# Patient Record
Sex: FEMALE | Race: WHITE | Employment: UNEMPLOYED | URBAN - METROPOLITAN AREA
[De-identification: names, ages, dates, MRNs, and addresses within clinical notes are randomized per-mention and may not be internally consistent; named-entity substitution may affect disease eponyms.]

---

## 2022-08-10 ENCOUNTER — OFFICE VISIT (OUTPATIENT)
Dept: FAMILY MEDICINE CLINIC | Age: 62
End: 2022-08-10
Payer: COMMERCIAL

## 2022-08-10 VITALS
WEIGHT: 265 LBS | TEMPERATURE: 97 F | DIASTOLIC BLOOD PRESSURE: 80 MMHG | HEIGHT: 67 IN | BODY MASS INDEX: 41.59 KG/M2 | OXYGEN SATURATION: 96 % | SYSTOLIC BLOOD PRESSURE: 138 MMHG | HEART RATE: 83 BPM

## 2022-08-10 DIAGNOSIS — S46.001A ROTATOR CUFF INJURY, RIGHT, INITIAL ENCOUNTER: Primary | ICD-10-CM

## 2022-08-10 PROCEDURE — 99202 OFFICE O/P NEW SF 15 MIN: CPT | Performed by: NURSE PRACTITIONER

## 2022-08-10 RX ORDER — PRAVASTATIN SODIUM 80 MG/1
TABLET ORAL
COMMUNITY

## 2022-08-10 RX ORDER — LISINOPRIL 20 MG/1
TABLET ORAL
COMMUNITY

## 2022-08-10 RX ORDER — ESOMEPRAZOLE MAGNESIUM 20 MG/1
20 FOR SUSPENSION ORAL DAILY
COMMUNITY

## 2022-08-10 RX ORDER — ASPIRIN 81 MG/1
TABLET ORAL
COMMUNITY

## 2022-08-10 RX ORDER — METFORMIN HYDROCHLORIDE 500 MG/1
TABLET, EXTENDED RELEASE ORAL
COMMUNITY
Start: 2021-09-21

## 2022-08-10 RX ORDER — HYDROCHLOROTHIAZIDE 12.5 MG/1
12.5 CAPSULE, GELATIN COATED ORAL DAILY
COMMUNITY

## 2022-08-10 RX ORDER — MAGNESIUM OXIDE 400 MG/1
TABLET ORAL
COMMUNITY

## 2022-08-10 RX ORDER — RIBOFLAVIN (VITAMIN B2) 100 MG
TABLET ORAL
COMMUNITY

## 2022-08-10 RX ORDER — GLIPIZIDE 5 MG/1
TABLET, FILM COATED, EXTENDED RELEASE ORAL
COMMUNITY
Start: 2022-08-08

## 2022-08-10 SDOH — ECONOMIC STABILITY: FOOD INSECURITY: WITHIN THE PAST 12 MONTHS, YOU WORRIED THAT YOUR FOOD WOULD RUN OUT BEFORE YOU GOT MONEY TO BUY MORE.: NEVER TRUE

## 2022-08-10 SDOH — ECONOMIC STABILITY: FOOD INSECURITY: WITHIN THE PAST 12 MONTHS, THE FOOD YOU BOUGHT JUST DIDN'T LAST AND YOU DIDN'T HAVE MONEY TO GET MORE.: NEVER TRUE

## 2022-08-10 ASSESSMENT — ENCOUNTER SYMPTOMS
SHORTNESS OF BREATH: 0
COUGH: 0
RHINORRHEA: 0
DIARRHEA: 0
ABDOMINAL PAIN: 0
VOMITING: 0
SORE THROAT: 0
WHEEZING: 0
ABDOMINAL DISTENTION: 0
CHEST TIGHTNESS: 0
SINUS PAIN: 0
SINUS PRESSURE: 0
TROUBLE SWALLOWING: 0
COLOR CHANGE: 0
NAUSEA: 0
CONSTIPATION: 0

## 2022-08-10 ASSESSMENT — PATIENT HEALTH QUESTIONNAIRE - PHQ9
SUM OF ALL RESPONSES TO PHQ9 QUESTIONS 1 & 2: 0
SUM OF ALL RESPONSES TO PHQ QUESTIONS 1-9: 0
1. LITTLE INTEREST OR PLEASURE IN DOING THINGS: 0
2. FEELING DOWN, DEPRESSED OR HOPELESS: 0
SUM OF ALL RESPONSES TO PHQ QUESTIONS 1-9: 0

## 2022-08-10 ASSESSMENT — SOCIAL DETERMINANTS OF HEALTH (SDOH): HOW HARD IS IT FOR YOU TO PAY FOR THE VERY BASICS LIKE FOOD, HOUSING, MEDICAL CARE, AND HEATING?: NOT VERY HARD

## 2022-08-10 NOTE — PROGRESS NOTES
urgency. Musculoskeletal:  Positive for arthralgias, myalgias and stiffness. Negative for gout, joint swelling, neck pain and neck stiffness. Skin:  Negative for color change, itching and rash. Neurological:  Negative for dizziness, tingling, tremors, seizures, syncope, speech difficulty, weakness, light-headedness, numbness and headaches. PAST MEDICAL HISTORY   No past medical history on file. SURGICAL HISTORY     Patient  has no past surgical history on file. CURRENT MEDICATIONS       Previous Medications    ASCORBIC ACID (VITAMIN C) 100 MG TABLET        ASPIRIN 81 MG EC TABLET        CHOLECALCIFEROL 50 MCG (2000 UT) TABS        ESOMEPRAZOLE MAGNESIUM (NEXIUM) 20 MG PACK    Take 20 mg by mouth in the morning. GLIPIZIDE (GLUCOTROL XL) 5 MG EXTENDED RELEASE TABLET        HYDROCHLOROTHIAZIDE (MICROZIDE) 12.5 MG CAPSULE    Take 12.5 mg by mouth in the morning. LACTOBACILLUS (ACIDOPHILUS) 0.5 MG TABS    Take 5 tablets by mouth    LISINOPRIL (PRINIVIL;ZESTRIL) 20 MG TABLET    lisinopril   daily    MAGNESIUM 100 MG CAPS        MAGNESIUM OXIDE (MAG-OX) 400 MG TABLET    Take by mouth    METFORMIN (GLUCOPHAGE-XR) 500 MG EXTENDED RELEASE TABLET        METHYLCOBALAMIN 1 MG CHEW        MULTIPLE VITAMINS-MINERALS (THRIVE FOR LIFE WOMENS) TABS        POTASSIUM 75 MG TABS    Take by mouth    PRAVASTATIN (PRAVACHOL) 80 MG TABLET           ALLERGIES     Patientis is allergic to penicillins and adhesive tape. FAMILY HISTORY     Patient's family history is not on file. SOCIAL HISTORY     Patient  reports that she has never smoked. She has never used smokeless tobacco. She reports that she does not drink alcohol and does not use drugs. PHYSICAL EXAM     ED TRIAGE VITALS  BP: 138/80, Temp: 97 °F (36.1 °C), Heart Rate: 83,  , SpO2: 96 %  Physical Exam  Constitutional:       General: She is not in acute distress. Appearance: Normal appearance. She is normal weight.  She is not ill-appearing, toxic-appearing or diaphoretic. HENT:      Head: Normocephalic and atraumatic. Right Ear: External ear normal.      Left Ear: External ear normal.   Eyes:      General:         Right eye: No discharge. Left eye: No discharge. Conjunctiva/sclera: Conjunctivae normal.      Pupils: Pupils are equal, round, and reactive to light. Cardiovascular:      Rate and Rhythm: Normal rate and regular rhythm. Pulses: Normal pulses. Pulmonary:      Effort: Pulmonary effort is normal.   Musculoskeletal:         General: Tenderness present. No swelling, deformity or signs of injury. Right shoulder: Tenderness present. No swelling, deformity, laceration, bony tenderness or crepitus. Decreased range of motion. Normal strength. Normal pulse. Arms:       Cervical back: Normal range of motion and neck supple. No rigidity or tenderness. Skin:     General: Skin is warm and dry. Capillary Refill: Capillary refill takes less than 2 seconds. Neurological:      General: No focal deficit present. Mental Status: She is alert and oriented to person, place, and time. Mental status is at baseline. Cranial Nerves: No cranial nerve deficit. Sensory: No sensory deficit. Motor: No weakness. Coordination: Coordination normal.       DIAGNOSTICRESULTS   Labs:       IMAGING:    URGENT CARE COURSE:     Vitals:    08/10/22 1604   BP: 138/80   Pulse: 83   Temp: 97 °F (36.1 °C)   TempSrc: Temporal   SpO2: 96%   Weight: 265 lb (120.2 kg)   Height: 5' 7\" (1.702 m)         PROCEDURES:  None  FINAL IMPRESSION      1. Rotator cuff injury, right, initial encounter        DISPOSITION/PLAN   DISPOSITION      PATIENT REFERRED TO:  Return in about 1 day (around 8/11/2022) for orthopedics as scheduled. DISCHARGE MEDICATIONS:  New Prescriptions    No medications on file     Cannot display discharge medications since this is not an admission.       Daniel Grijalva, APRN - CNP

## 2022-08-11 ENCOUNTER — OFFICE VISIT (OUTPATIENT)
Dept: ORTHOPEDIC SURGERY | Age: 62
End: 2022-08-11
Payer: COMMERCIAL

## 2022-08-11 ENCOUNTER — HOSPITAL ENCOUNTER (OUTPATIENT)
Dept: ORTHOPEDIC SURGERY | Age: 62
Discharge: HOME | End: 2022-08-13
Payer: COMMERCIAL

## 2022-08-11 VITALS
TEMPERATURE: 98.2 F | WEIGHT: 265 LBS | HEIGHT: 67 IN | BODY MASS INDEX: 41.59 KG/M2 | OXYGEN SATURATION: 97 % | HEART RATE: 78 BPM

## 2022-08-11 DIAGNOSIS — S46.011A ROTATOR CUFF STRAIN, RIGHT, INITIAL ENCOUNTER: ICD-10-CM

## 2022-08-11 DIAGNOSIS — S46.011A ROTATOR CUFF STRAIN, RIGHT, INITIAL ENCOUNTER: Primary | ICD-10-CM

## 2022-08-11 PROCEDURE — 73030 X-RAY EXAM OF SHOULDER: CPT

## 2022-08-11 PROCEDURE — 99242 OFF/OP CONSLTJ NEW/EST SF 20: CPT | Performed by: PHYSICIAN ASSISTANT

## 2022-08-11 PROCEDURE — 73030 X-RAY EXAM OF SHOULDER: CPT | Performed by: ORTHOPAEDIC SURGERY

## 2022-08-11 PROCEDURE — 20610 DRAIN/INJ JOINT/BURSA W/O US: CPT | Performed by: PHYSICIAN ASSISTANT

## 2022-08-11 RX ORDER — LIDOCAINE HYDROCHLORIDE 10 MG/ML
8 INJECTION, SOLUTION INFILTRATION; PERINEURAL ONCE
Status: COMPLETED | OUTPATIENT
Start: 2022-08-11 | End: 2022-08-11

## 2022-08-11 RX ORDER — TRIAMCINOLONE ACETONIDE 40 MG/ML
80 INJECTION, SUSPENSION INTRA-ARTICULAR; INTRAMUSCULAR ONCE
Status: COMPLETED | OUTPATIENT
Start: 2022-08-11 | End: 2022-08-11

## 2022-08-11 RX ADMIN — LIDOCAINE HYDROCHLORIDE 8 ML: 10 INJECTION, SOLUTION INFILTRATION; PERINEURAL at 11:11

## 2022-08-11 RX ADMIN — TRIAMCINOLONE ACETONIDE 80 MG: 40 INJECTION, SUSPENSION INTRA-ARTICULAR; INTRAMUSCULAR at 11:09

## 2022-08-11 ASSESSMENT — ENCOUNTER SYMPTOMS
GASTROINTESTINAL NEGATIVE: 1
EYES NEGATIVE: 1
RESPIRATORY NEGATIVE: 1

## 2022-08-11 NOTE — PROGRESS NOTES
Stephanie Hunter (:  1960) is a 64 y.o. female,New patient, consultation from JULIÁN Romano here for evaluation of the following chief complaint(s):  New Patient (Right shoulder pain. Pt has not had XR or MRI. Pt states this has been going on for 6 weeks. )         ASSESSMENT/PLAN:  1. Rotator cuff strain, right, initial encounter  -     XR SHOULDER RIGHT (MIN 2 VIEWS); Future  -     CT ARTHROCENTESIS ASPIR&/INJ MAJOR JT/BURSA W/O US  -     Ambulatory referral to Physical Therapy      Return in about 3 weeks (around 2022). Subjective   SUBJECTIVE/OBJECTIVE:  This is a 79-year-old female with complaint of right shoulder pain. She states she has been working out and felt a pulling sensation in her right shoulder when she was doing an overhead press. She states that shoulder has been sore and it has been limiting her activities. She has difficulty curling her hair, she has difficulty fastening her brassiere. Review of Systems   Constitutional: Negative. HENT: Negative. Eyes: Negative. Respiratory: Negative. Gastrointestinal: Negative. Genitourinary: Negative. Musculoskeletal: Negative. Psychiatric/Behavioral: Negative. Objective   Right shoulder x-rays performed today reviewed by me show no acute fracture or dislocation. Glenohumeral joint space is preserved. Moderate degenerative arthritis of the acromioclavicular joint. Physical Exam  Musculoskeletal:      Comments: Shoulder-no acromioclavicular, clavicle, SC joint tenderness with palpation. Abduction and abduction strength are strong and symmetrical.  Apprehension sign is negative. Internal rotation is 0 degrees, external rotation is 120 degrees about 10 degrees less than the left. Supraspinatus test elicits pain but no weakness. Biceps contour is normal.  Liftoff is negative. Patient reaches T11 behind her back with her right, T5 behind her back with her left.   Sensation is intact distally to light touch. Shoulder Injection Procedure Note    Pre-operative Diagnosis:    Diagnosis Orders   1. Rotator cuff strain, right, initial encounter  XR SHOULDER RIGHT (MIN 2 VIEWS)    FL ARTHROCENTESIS ASPIR&/INJ MAJOR JT/BURSA W/O US    Ambulatory referral to Physical Therapy           Post-operative Diagnosis:    Diagnosis Orders   1. Rotator cuff strain, right, initial encounter  XR SHOULDER RIGHT (MIN 2 VIEWS)    FL ARTHROCENTESIS ASPIR&/INJ MAJOR JT/BURSA W/O US    Ambulatory referral to Physical Therapy           Indications: tendonitis    Anesthesia: Ethyl Chloride    Procedure Details     Verbal consent was obtained for the procedure. The right shoulder was prepped with iodine and the skin was anesthetized. Using a 22 gauge needle the  is injected with 8 mL 1% lidocaine and 2 mL of triamcinolone (KENALOG) 40mg/ml under the posterior aspect of the acromion. The injection site was cleansed with topical isopropyl alcohol and a dressing was applied. Complications:  None; patient tolerated the procedure well. Explained to the patient she has rotator cuff injury. Injected today with cortisone. She will begin physical therapy. She is going with her family next week to [facility]. I like her to have at least 1 visit to physical therapy under her belt before she leaves. Like to see her back in about 3 weeks. He may take over-the-counter anti-inflammatory medications as well as Tylenol for pain. May use topical anti-inflammatory creams. On this date 8/11/2022 I have spent 35 minutes reviewing previous notes, test results and face to face with the patient discussing the diagnosis and importance of compliance with the treatment plan as well as documenting on the day of the visit. An electronic signature was used to authenticate this note.     --GARY Oilvares

## 2022-08-11 NOTE — PROGRESS NOTES
Patient's name, date of birth, and allergies have been confirmed. Patient is aware that injection is to be given in Right shoulder. He/she is aware that they will be seeing [facility] and the injection will be given by him. A timeout was performed immediately prior to the start of the injection procedure and included the correct patient (two identifiers), correct procedure and correct site(s). Procedure consent and allergies were also verified.

## 2022-08-18 ENCOUNTER — HOSPITAL ENCOUNTER (OUTPATIENT)
Dept: PHYSICAL THERAPY | Age: 62
Setting detail: THERAPIES SERIES
Discharge: HOME | End: 2022-08-18
Payer: COMMERCIAL

## 2022-08-18 PROCEDURE — 97161 PT EVAL LOW COMPLEX 20 MIN: CPT

## 2022-08-18 ASSESSMENT — PAIN DESCRIPTION - DESCRIPTORS: DESCRIPTORS: DULL

## 2022-08-18 ASSESSMENT — PAIN DESCRIPTION - LOCATION: LOCATION: ARM;SHOULDER

## 2022-08-18 ASSESSMENT — PAIN DESCRIPTION - ORIENTATION: ORIENTATION: RIGHT

## 2022-08-18 ASSESSMENT — PAIN SCALES - GENERAL: PAINLEVEL_OUTOF10: 3

## 2022-08-18 ASSESSMENT — PAIN DESCRIPTION - PAIN TYPE: TYPE: CHRONIC PAIN

## 2022-08-18 NOTE — PROGRESS NOTES
[address]  PHYSICAL THERAPY EVALUATION      Physical Therapy: Initial Evaluation    Patient: Shimon Laws (29 y.o.     female)   Examination Date: 2022   :  1960 ;    Confirmed: Yes MRN: [id number]  CSN: [id number]   Insurance: Payor: [employer] / Plan: Misbah Huff CINTHIA / Product Type: *No Product type* /   Insurance ID: [id number] - (Commercial) Secondary Insurance (if applicable):    Referring Physician: GARY Hernandez      PCP: Estela Tao DO Visits to Date/Visits Approved:  (Eval only)    No Show/Cancelled Appts:   /       Medical Diagnosis: Strain of muscle(s) and tendon(s) of the rotator cuff of right shoulder, initial encounter [S46.011A]    Treatment Diagnosis: Decreased R shoulder ROM, decreased R shoulder strength, increased pain, decreased ADL status     PERTINENT MEDICAL HISTORY   Patient Assessed for Rehabilitation Services: Yes       Medical History: Chart Reviewed: Yes No past medical history on file. Surgical History: No past surgical history on file.     Medications:   Current Outpatient Medications:     lisinopril (PRINIVIL;ZESTRIL) 20 MG tablet, lisinopril  daily, Disp: , Rfl:     Magnesium 100 MG CAPS, , Disp: , Rfl:     Potassium 75 MG TABS, Take by mouth, Disp: , Rfl:     Multiple Vitamins-Minerals (THRIVE FOR LIFE WOMENS) TABS, , Disp: , Rfl:     Ascorbic Acid (VITAMIN C) 100 MG tablet, , Disp: , Rfl:     Cholecalciferol 50 MCG (2000) TABS, , Disp: , Rfl:     aspirin 81 MG EC tablet, , Disp: , Rfl:     glipiZIDE (GLUCOTROL XL) 5 MG extended release tablet, , Disp: , Rfl:     hydroCHLOROthiazide (MICROZIDE) 12.5 MG capsule, Take 12.5 mg by mouth in the morning., Disp: , Rfl:     magnesium oxide (MAG-OX) 400 MG tablet, Take by mouth, Disp: , Rfl:     metFORMIN (GLUCOPHAGE-XR) 500 MG extended release tablet, , Disp: , Rfl:     Methylcobalamin 1 MG CHEW, , Disp: , Rfl:     pravastatin (PRAVACHOL) 80 MG tablet, Spouse  Type of Home: House  Home Layout: Two level, Laundry in basement  Home Access: Stairs to enter without rails  Entrance Stairs - Number of Steps: 2    Occupation/Interests:   Occupation: Other(comment)  Type of Occupation: Homemaker  Leisure & Hobbies: workingout, weight-lifting    Prior Level of Function:     Independent        Current Level of Function:          ADL Assistance: Independent  Homemaking Assistance: Independent  Homemaking Responsibilities: Yes  Ambulation Assistance: Independent  Transfer Assistance: Independent  Active : Yes    Dominant Hand: : Right    OBJECTIVE EXAMINATION     Review of Systems:  Vision: Impaired  Visual Deficits: Wears glasses  Hearing: Exceptions to [facility]  Hearing Exceptions: Hard of hearing/hearing concerns (has hearing aids but does not wear them)  Follows Commands: Within Functional Limits    Regional Screen:    Cervical Screen: decreased cervical ROM, d/t prior chronic neck pain per patient    Observations:   General Observations  General Observations: Yes  Description: Decreased sitting posture, fwd head, rounded shoulders, thoracic kyphosis    Palpation:    Inc tenderness to palpation over ant deltoid, biceps muscle belly    Mobility:      Ambulation  Surface: carpet  Device: No Device  Assistance: Independent  Quality of Gait: ambulation WFL, no significant deficits to note  Distance: clinical distance  More Ambulation?: No  Stairs/Curb  Stairs?: No    Neuro Screen: Sensation  Overall Sensation Status: WFL  Light Touch: No apparent deficits (intact to light touch over claudia UE dermatomal pattern)    Left AROM  Right AROM         AROM LUE (degrees)  L Shoulder Flexion 0-180: 143  L Shoulder ABduction 0-180: 140  L Shoulder Int Rotation  0-70: T7  L Shoulder Ext Rotation  0-90: T1    AROM RUE (degrees)  R Shoulder Flexion 0-180: 115  R Shoulder ABduction 0-180: 115  R Shoulder Int Rotation  0-70: T7  R Shoulder Ext Rotation 0-90: occiput      Left Strength  Right Strength         Strength LUE  L Shoulder Flexion: 5/5  L Shoulder ABduction: 5/5  L Shoulder Internal Rotation: 5/5  L Shoulder External Rotation: 5/5  L Elbow Flexion: 5/5  L Elbow Extension: 5/5    Strength RUE  R Shoulder Flexion: 3+/5  R Shoulder ABduction: 3+/5  R Shoulder Internal Rotation: 5/5  R Shoulder External Rotation: 3+/5  R Elbow Flexion: 5/5  R Elbow Extension: 5/5     Cervical Assessment     Decreased cervical ROM d/t chronic neck pain per patient        Special Tests:   Impingement Tests  Hawkins Parth Test: R (+), L (-) ((+) at endrange)  Lift-Off Test: R (-), L (-)  Empty Can: R (+), L (-)  Full Can: R (-), L (-)  Speeds Test: R (+), L (-)    Outcomes Score:  Exam: UEFI: 55/80       Treatment:    Exercises:   Exercises  Exercise 1: wall slides*  Exercise 2: UBE*  Exercise 3: Pulley*  Exercise 4: scapular stabilization*  Exercise 5: I,Y,T*  Exercise 6: rows/lats*  Exercise 7: corner stretch*     Modalities:  Modalities:  (HP vs CP*, US*)     Manual:  Manual Therapy  Joint Mobilization: PROM w/ distraction, joint mobs to shldr*  Soft Tissue Mobilizaton: STM to deltoid, biceps*     *Indicates exercise,modality, or manual techniques to be initiated when appropriate       ASSESSMENT     Impression: Assessment: Patient is a 65 yo female presenting with R shoulder and arm pain after initiation of weightlifting at the gym. Patient presented with decrease R shoulder ROM and weakness. ROM deficits more prominent in ABD and ER. Patient presented with increased TTP over biceps muscle belly, biceps origin, ant. deltoid. Patient also presented with positive speeds test, ER test and empty can. Indicating RTC and biceps pathology. Skilled therapy is indicated to improve stated deficits, to increase QOL and return to normal housework and weightlifting.     Body Structures, Functions, Activity Limitations Requiring Skilled Therapeutic Intervention: Decreased ADL status, Decreased functional mobility , Decreased ROM, Decreased strength, Increased pain    Statement of Medical Necessity: Physical Therapy is both indicated and medically necessary as outlined in the POC to increase the likelihood of meeting the functionally related goals stated below.      Patient's Activity Tolerance: Patient tolerated treatment well      Patient's rehabilitation potential/prognosis is considered to be: Good    Factors which may impact rehabilitation potential include: None     Patient Education: Goals, PT Role, Plan of Care, Evaluative findings, Insurance      GOALS   Patient Goal(s): Patient goals : \"strengthen right arm\"    Short Term Goals Completed by 2 weeks Goal Status   Patient will be indep with HEP New       Long Term Goals Completed by 6-8 weeks Goal Status   Patient will improve R shoulder ROM by >/=20 deg to improve mechanics with brushing hair and getting dressed New   Patient will increase R shoulder strength to 5/5 to equal opposite side to improve lifting mechanics during house work New   Patient will improve UEFI by >/=10 points to correlate to [employer] to demonstrate improved QOL New          TREATMENT PLAN       Requires PT Follow-Up: Yes    Treatment may include any combination of the following: Strengthening, ROM, Manual Therapy - Soft Tissue Mobilization, Neuromuscular re-education, Manual Therapy - Joint Manipulation, Pain management, Return to work related activity, Home exercise program, Safety education & training, Modalities, Integrated dry needling, Therapeutic activities     Frequency / Duration:  Patient to be seen 1x times per week for 6-8 weeks weeks  Plan Comment:               Eval Complexity:   Decision Making: Low Complexity  History: Personal Factors and/or Comorbidities Impacting POC: High  History: broken bones, diabetes, acid reflux, fatty liver disease, highBP, bronchitis, hearing loss, asthma  Examination of body system(s) including body structures and functions, activity limitations, and/or

## 2022-08-18 NOTE — PROGRESS NOTES
[address]     Ph: [phone]  Fax: [phone]      [x] Certification  [] Recertification [x]  Plan of Care  [] Progress Note [] Discharge      Referring Provider: GARY Jack      From:  Radha Gunter, PT , DPT  Patient: Winifred Naik (64 y.o. female) : 1960 Date: 2022   Medical Diagnosis: Strain of muscle(s) and tendon(s) of the rotator cuff of right shoulder, initial encounter [S46.011A]    Treatment Diagnosis: Decreased R shoulder ROM, decreased R shoulder strength, increased pain, decreased ADL status    Plan of Care/Certification Expiration Date: :     Progress Report Period from:  2022  to 2022    Visits to Date: 1 No Show:   Cancelled Appts:      OBJECTIVE:   Short Term Goals - Time Frame for Short term goals: 2 weeks    Goals Current/Discharge status  Status   Short term goal 1: Patient will be indep with HEP  Will administer at [address] [place] [address] Road - Time Frame for Long term goals : 6-8 weeks  Goals Current/ Discharge status Status   Long term goal 1: Patient will improve R shoulder ROM by >/=20 deg to improve mechanics with brushing hair and getting dressed    AROM LUE (degrees)  L Shoulder Flexion 0-180: 143  L Shoulder ABduction 0-180: 140  L Shoulder Int Rotation  0-70: T7  L Shoulder Ext Rotation  0-90: T1   AROM RUE (degrees)  R Shoulder Flexion 0-180: 115  R Shoulder ABduction 0-180: 115  R Shoulder Int Rotation  0-70: T7  R Shoulder Ext Rotation 0-90: occiput  New   Long term goal 2: Patient will increase R shoulder strength to 5/5 to equal opposite side to improve lifting mechanics during house work Strength LUE  L Shoulder Flexion: 5/5  L Shoulder ABduction: 5/5  L Shoulder Internal Rotation: 5/5  L Shoulder External Rotation: 5/5  L Elbow Flexion: 5/5  L Elbow Extension: 5/5  Strength RUE  R Shoulder Flexion: 3+/5  R Shoulder ABduction: 3+/5  R Shoulder Internal Rotation: 5/5  R Shoulder External Rotation: 3+/5  R Elbow Flexion: 5/5  R Elbow Extension: 5/5  New   Long term goal 3: Patient will improve UEFI by >/=10 points to correlate to [facility] to demonstrate improved QOL UEFI: 55/80 New       Body Structures, Functions, Activity Limitations Requiring Skilled Therapeutic Intervention: Decreased ADL status, Decreased functional mobility , Decreased ROM, Decreased strength, Increased pain  Assessment: Patient is a 63 yo female presenting with R shoulder and arm pain after initiation of weightlifting at the gym. Patient presented with decrease R shoulder ROM and weakness. ROM deficits more prominent in ABD and ER. Patient presented with increased TTP over biceps muscle belly, biceps origin, ant. deltoid. Patient also presented with positive speeds test, ER test and empty can. Indicating RTC and biceps pathology. Skilled therapy is indicated to improve stated deficits, to increase QOL and return to normal housework and weightlifting. Therapy Prognosis: Good      PT Education: Goals;PT Role;Plan of Care;Evaluative findings; Insurance    PLAN: [x] Evaluate and Treat  Frequency/Duration:  Plan Frequency: 1x  Plan weeks: 6-8 weeks  Current Treatment Recommendations: Strengthening, ROM, Manual Therapy - Soft Tissue Mobilization, Neuromuscular re-education, Manual Therapy - Joint Manipulation, Pain management, Return to work related activity, Home exercise program, Safety education & training, Modalities, Integrated dry needling, Therapeutic activities             Patient Status:[x] Continue/ Initiate plan of Care    [] Discharge PT. Recommend pt continue with HEP. [] Additional visits requested, Please re-certify for additional visits:    [] Hold         Signature: Electronically signed by Kumar Lee PT on 8/18/22 at 2:12 PM EDT      If you have any questions or concerns, please don't hesitate to call.   Thank you for your referral.

## 2022-09-01 ENCOUNTER — OFFICE VISIT (OUTPATIENT)
Dept: ORTHOPEDIC SURGERY | Age: 62
End: 2022-09-01
Payer: COMMERCIAL

## 2022-09-01 ENCOUNTER — HOSPITAL ENCOUNTER (OUTPATIENT)
Dept: ORTHOPEDIC SURGERY | Age: 62
Discharge: HOME | End: 2022-09-03
Payer: COMMERCIAL

## 2022-09-01 VITALS
BODY MASS INDEX: 41.59 KG/M2 | HEART RATE: 78 BPM | WEIGHT: 265 LBS | HEIGHT: 67 IN | OXYGEN SATURATION: 96 % | TEMPERATURE: 97.5 F

## 2022-09-01 DIAGNOSIS — S46.011D: Primary | ICD-10-CM

## 2022-09-01 DIAGNOSIS — S83.412A SPRAIN OF MEDIAL COLLATERAL LIGAMENT OF LEFT KNEE, INITIAL ENCOUNTER: ICD-10-CM

## 2022-09-01 PROCEDURE — 73564 X-RAY EXAM KNEE 4 OR MORE: CPT | Performed by: ORTHOPAEDIC SURGERY

## 2022-09-01 PROCEDURE — 99214 OFFICE O/P EST MOD 30 MIN: CPT | Performed by: PHYSICIAN ASSISTANT

## 2022-09-01 PROCEDURE — 73564 X-RAY EXAM KNEE 4 OR MORE: CPT

## 2022-09-01 ASSESSMENT — ENCOUNTER SYMPTOMS
EYES NEGATIVE: 1
RESPIRATORY NEGATIVE: 1
GASTROINTESTINAL NEGATIVE: 1

## 2022-09-01 NOTE — PROGRESS NOTES
Stephanie Hunter (:  1960) is a 64 y.o. female,Established patient, here for evaluation of the following chief complaint(s):  Follow-up (Follow up on right shoulder. Pt states she was supposed to start physical therapy but she never received call to start. Pt states her right shoulder feels weak when she try's to move it. Patient states she slipped on rocks, and tried to hold her self twisted her knee. [facility]. Patient states knee pain a 7/10.)         ASSESSMENT/PLAN:  1. Strain of rotator cuff capsule, right, subsequent encounter  2. Sprain of medial collateral ligament of left knee, initial encounter  -     XR KNEE LEFT (MIN 4 VIEWS); Future      No follow-ups on file. Subjective   SUBJECTIVE/OBJECTIVE:  Is a 79-year-old right-hand-dominant female with complaint of right shoulder pain as well as left knee pain. I did seen her previously for right shoulder pain. I injected her shoulder she attended her initial physical therapy visit. Her daughter-in-law put her into a knee immobilizer. There was concern for a ligament or tendon injury of the left knee. Review of Systems   Constitutional: Negative. HENT: Negative. Eyes: Negative. Respiratory: Negative. Gastrointestinal: Negative. Genitourinary: Negative. Musculoskeletal: Negative. Psychiatric/Behavioral: Negative. Objective     X-rays left knee performed today reviewed by me show no acute fracture or dislocation. Physical Exam  Musculoskeletal:      Comments: Left knee-joint effusion present. Popliteal cyst present. No warmth, erythema, fluctuance or sign of infection. Good extension strength against resistance. Flexion becomes painful at 70 degrees. The knee joint is stable, there is no laxity with valgus or varus stress. Tenderness with palpation at the medial femoral condyle. Patellofemoral crepitus with range of motion. Calf is soft and nontender.     Shoulder-no acromioclavicular, clavicle, SC joint tenderness with palpation. Internal rotation is 0 degrees, external rotations 140 degrees which is symmetrical.  Supraspinatus test elicits mild pain but no specific weakness. Apprehension sign is negative. Biceps contour is normal.     Explained the x-rays with the patient and her . I see no evidence of fracture. She is able to extend her left leg at the knee. I see no evidence of a quad tendon tear. She does have an effusion. We will hold on any aspirate at this time. I would like her to begin gentle range of motion exercises. She may come out of the knee immobilizer. Like to see her back in a couple weeks. We will give her the number for physical therapy to call and contact them to restart therapy for her shoulder. On this date 9/1/2022 I have spent 38 minutes reviewing previous notes, test results and face to face with the patient discussing the diagnosis and importance of compliance with the treatment plan as well as documenting on the day of the visit. An electronic signature was used to authenticate this note.     --GARY Mir

## 2022-09-13 ENCOUNTER — HOSPITAL ENCOUNTER (OUTPATIENT)
Dept: PHYSICAL THERAPY | Age: 62
Setting detail: THERAPIES SERIES
End: 2022-09-13
Payer: COMMERCIAL

## 2022-09-15 ENCOUNTER — OFFICE VISIT (OUTPATIENT)
Dept: ORTHOPEDIC SURGERY | Age: 62
End: 2022-09-15
Payer: COMMERCIAL

## 2022-09-15 VITALS — WEIGHT: 265 LBS | HEIGHT: 67 IN | BODY MASS INDEX: 41.59 KG/M2

## 2022-09-15 DIAGNOSIS — S46.011D: ICD-10-CM

## 2022-09-15 DIAGNOSIS — M25.462 KNEE EFFUSION, LEFT: ICD-10-CM

## 2022-09-15 DIAGNOSIS — S83.412A SPRAIN OF MEDIAL COLLATERAL LIGAMENT OF LEFT KNEE, INITIAL ENCOUNTER: Primary | ICD-10-CM

## 2022-09-15 PROCEDURE — 99214 OFFICE O/P EST MOD 30 MIN: CPT | Performed by: PHYSICIAN ASSISTANT

## 2022-09-15 PROCEDURE — 20610 DRAIN/INJ JOINT/BURSA W/O US: CPT | Performed by: PHYSICIAN ASSISTANT

## 2022-09-15 RX ORDER — LIDOCAINE HYDROCHLORIDE 10 MG/ML
8 INJECTION, SOLUTION INFILTRATION; PERINEURAL ONCE
Status: COMPLETED | OUTPATIENT
Start: 2022-09-15 | End: 2022-09-15

## 2022-09-15 RX ORDER — TRIAMCINOLONE ACETONIDE 40 MG/ML
80 INJECTION, SUSPENSION INTRA-ARTICULAR; INTRAMUSCULAR ONCE
Status: COMPLETED | OUTPATIENT
Start: 2022-09-15 | End: 2022-09-15

## 2022-09-15 RX ADMIN — TRIAMCINOLONE ACETONIDE 80 MG: 40 INJECTION, SUSPENSION INTRA-ARTICULAR; INTRAMUSCULAR at 13:10

## 2022-09-15 RX ADMIN — LIDOCAINE HYDROCHLORIDE 8 ML: 10 INJECTION, SOLUTION INFILTRATION; PERINEURAL at 13:08

## 2022-09-15 ASSESSMENT — ENCOUNTER SYMPTOMS
RESPIRATORY NEGATIVE: 1
GASTROINTESTINAL NEGATIVE: 1
EYES NEGATIVE: 1

## 2022-09-15 NOTE — PROGRESS NOTES
Spoke to patient, results given per MD instruction, pt expressed verbal understanding, no additional questions at this time.    Antoinette JC   Stephanie Hunter (:  1960) is a 64 y.o. female,Established patient, here for evaluation of the following chief complaint(s):  2 Week Follow-Up (Patient present for a follow up on her knee and shoulder. )         ASSESSMENT/PLAN:  1. Sprain of medial collateral ligament of left knee, initial encounter  2. Strain of rotator cuff capsule, right, subsequent encounter      No follow-ups on file. Subjective   SUBJECTIVE/OBJECTIVE:  This is a 49-year-old right-hand-dominant female with complaint of right shoulder and left knee pain. States the left knee continues to be swollen however it is about 65% better. She is scheduled to begin physical therapy for her shoulder next week. She states she is walking with little difficulty. She does walk with a cane to prevent her falling. She states the knee at times can feel unstable. Review of Systems   Constitutional: Negative. HENT: Negative. Eyes: Negative. Respiratory: Negative. Gastrointestinal: Negative. Genitourinary: Negative. Musculoskeletal: Negative. Psychiatric/Behavioral: Negative. Objective   Physical Exam     Time Out  [x] Patient Verified  [x] Site Verified  [x] Laterality Verified  [x] Procedure Verified    Before aspiration/injection risks/benefits of a cortisone  injection including infection, local skin irritation, skin atrophy, continued pain/discomfort, elevated blood sugar, burning, failure to relieve pain, possible late infection were discussed with patient. Patient verbalized understanding and wanted to proceed with planned procedure. After prepping and draping the left knee in the usual sterile fashion, 2cc 40mg Kenalog and 8cc Lidocaine were injected intra-articularly after aspirating 48 clear yellow joint fluid without any complications. Patient tolerated this well.     Post-procedure discomfort can be alleviated with additional medications/ice/elevation/rest over the first 24 hours as recommended. The patient tolerated the procedure well. If fluid was aspirated that was abnormal it was sent for further studies  in sterile specimen container as ordered to the lab     Diagnosis Orders   1. Sprain of medial collateral ligament of left knee, initial encounter  [place] ARTHROCENTESIS ASPIR&/INJ MAJOR JT/BURSA W/O US      2. Strain of rotator cuff capsule, right, subsequent encounter        3. Knee effusion, left  [place] ARTHROCENTESIS ASPIR&/INJ MAJOR JT/BURSA W/O US      Explained to the patient that she does have a knee effusion. It was limiting her range of motion. We aspirated and injected the knee today with cortisone. She is to begin physical therapy early next week on her shoulder. She will follow-up with me in about 3 weeks. On this date 9/15/2022 I have spent 35 minutes reviewing previous notes, test results and face to face with the patient discussing the diagnosis and importance of compliance with the treatment plan as well as documenting on the day of the visit. An electronic signature was used to authenticate this note.     --GARY Candelaria

## 2022-09-20 ENCOUNTER — HOSPITAL ENCOUNTER (OUTPATIENT)
Dept: PHYSICAL THERAPY | Age: 62
Setting detail: THERAPIES SERIES
Discharge: HOME | End: 2022-09-20
Payer: COMMERCIAL

## 2022-09-20 PROCEDURE — 97140 MANUAL THERAPY 1/> REGIONS: CPT

## 2022-09-20 PROCEDURE — 97110 THERAPEUTIC EXERCISES: CPT

## 2022-09-20 ASSESSMENT — PAIN DESCRIPTION - LOCATION: LOCATION: SHOULDER

## 2022-09-20 ASSESSMENT — PAIN DESCRIPTION - PAIN TYPE: TYPE: CHRONIC PAIN

## 2022-09-20 ASSESSMENT — PAIN DESCRIPTION - ORIENTATION: ORIENTATION: RIGHT

## 2022-09-20 ASSESSMENT — PAIN SCALES - GENERAL: PAINLEVEL_OUTOF10: 3

## 2022-09-20 NOTE — PROGRESS NOTES
[facility]  Outpatient Physical Therapy    Treatment Note        Date: 2022  Patient: Skinny Thomas  : 1960   Confirmed: Yes  MRN: [id number]  Referring Provider: GARY Torrez    Medical Diagnosis: Strain of muscle(s) and tendon(s) of the rotator cuff of right shoulder, initial encounter [S46.011A]       Treatment Diagnosis: Decreased R shoulder ROM, decreased R shoulder strength, increased pain, decreased ADL status    Visit Information:  Insurance: Payor: Jostin Vasquez / Plan: Tracie Thomas CINTHIA / Product Type: *No Product type* /   PT Visit Information  Total # of Visits Approved: 10 (-2022)  Total # of Visits to Date: 1  Plan of Care/Certification Expiration Date: 22  No Show: 0  Progress Note Due Date: 22  Canceled Appointment: 0  Progress Note Counter: 1/10 (-)    Subjective Information:  Subjective: Patient reports shoulder is hurting, is equating it to possibly using the cane d/t recent MCL injury. patient reports shoulder feels heavy and weak.  Patient was stating she hasn't been in since evaulation d/t difficulty getting authorized  HEP Compliance: administered HEP this visit    Pain Screening  Patient Currently in Pain: Yes  Pain Level: 3  Pain Type: Chronic pain  Pain Location: Shoulder  Pain Orientation: Right    Treatment:  Exercises:  Exercises  Exercise 1: table slides flex & abd 10x10\" holds claudia each  Exercise 8: shoulder Isometric strengthening flexion/abduction/IR/ER 10x10\" holds  Exercise 20: HEP: table slides; shoulder iso flex/abd       Manual:   Manual Therapy  Joint Mobilization: PROM w/ distraction flexion, abduction, IR/ER x15 min       Modalities:  Cryotherapy (CPT 66550)  Patient Position: Seated  Number Minutes Cryotherapy: 10  Cryotherapy location: Right, Shoulder  Post treatment skin assessment: Intact       *Indicates exercise, modality, or manual techniques to be initiated when appropriate    Objective Measures: Strength: [] NT  [x] MMT completed:  Strength RUE  R Shoulder Flexion: 4/5  R Shoulder ABduction: 4/5  R Shoulder Internal Rotation: 5/5  R Shoulder External Rotation: 5/5     ROM: [] NT  [x] ROM measurements:    AROM RUE (degrees)  R Shoulder Flexion 0-180: 115  R Shoulder ABduction 0-180: 105  R Shoulder Int Rotation  0-70: T7  R Shoulder Ext Rotation 0-90: occiput      Assessment: Body Structures, Functions, Activity Limitations Requiring Skilled Therapeutic Intervention: Decreased ADL status, Decreased functional mobility , Decreased ROM, Decreased strength, Increased pain  Assessment: Patient presented to therapy for first treatment since being authorized. Patient reported pain and weakness. Strength and ROM on R shoulder taken, with patient demonstrating improvements in MMT strength from inital evaluation, while shoulder ROM has no improvements, with R shoulder abduction demonstrating decreased ROM. Patient tolerated treatment well with initiation of mild stretching and isometric strengthening. Concluded treatement with manual ROM with distraction to further stretch shoulder capsule as well as CP to R shoulder for relief of symptoms. Treatment Diagnosis: Decreased R shoulder ROM, decreased R shoulder strength, increased pain, decreased ADL status     Post-Pain Assessment:       Pain Rating (0-10 pain scale): \"better\"  /10   Location and pain description same as pre-treatment unless indicated.    Action: [] NA   [x] Perform HEP  [] Meds as prescribed  [] Modalities as prescribed   [] Call Physician     GOALS   Patient Goal(s): Patient goals : \"strengthen right arm\"    Short Term Goals Completed by 2 weeks Goal Status   STG 1 Patient will be indep with HEP In progress       Long Term Goals Completed by 6-8 weeks Goal Status   LTG 1 Patient will improve R shoulder ROM by >/=20 deg to improve mechanics with brushing hair and getting dressed In progress   LTG 2 Patient will increase R shoulder strength to 5/5 to equal opposite side to improve lifting mechanics during house work In progress   LTG 3 Patient will improve UEFI by >/=10 points to correlate to [employer] to demonstrate improved QOL In progress            Plan:  Frequency/Duration:  Plan  Plan Frequency: 1x  Plan weeks: 6-8 weeks  Current Treatment Recommendations: Strengthening, ROM, Manual Therapy - Soft Tissue Mobilization, Neuromuscular re-education, Manual Therapy - Joint Manipulation, Pain management, Return to work related activity, Home exercise program, Safety education & training, Modalities, Integrated dry needling, Therapeutic activities  Pt to continue current HEP. See objective section for any therapeutic exercise changes, additions or modifications this date.     Therapy Time:      PT Individual Minutes  Time In: 4239  Time Out: 1795  Minutes: 50  Timed Code Treatment Minutes: 40 Minutes  Procedure Minutes:x10 min CP  Timed Activity Minutes Units   Ther Ex 25 2   Manual  15 1     Electronically signed by Mahogany Meyers, PT on 9/20/22 at 3:26 PM EDT

## 2022-09-27 ENCOUNTER — HOSPITAL ENCOUNTER (OUTPATIENT)
Dept: PHYSICAL THERAPY | Age: 62
Setting detail: THERAPIES SERIES
Discharge: HOME | End: 2022-09-27
Payer: COMMERCIAL

## 2022-09-27 PROCEDURE — 97140 MANUAL THERAPY 1/> REGIONS: CPT

## 2022-09-27 PROCEDURE — 97110 THERAPEUTIC EXERCISES: CPT

## 2022-09-27 NOTE — PROGRESS NOTES
[facility]  Outpatient Physical Therapy    Treatment Note        Date: 2022  Patient: Skinny Thomas  : 1960   Confirmed: Yes  MRN: [id number]  Referring Provider: GARY Torrez    Medical Diagnosis: Strain of muscle(s) and tendon(s) of the rotator cuff of right shoulder, initial encounter [S46.011A]       Treatment Diagnosis: Decreased R shoulder ROM, decreased R shoulder strength, increased pain, decreased ADL status    Visit Information:  Insurance: Payor: Jostin Vasquez / Plan: Tracie So CINTHIA / Product Type: *No Product type* /   PT Visit Information  Total # of Visits Approved: 10 (-2022)  Total # of Visits to Date: 2  Plan of Care/Certification Expiration Date: 22  No Show: 0  Progress Note Due Date: 22  Canceled Appointment: 0  Progress Note Counter: 2/10 (-)    Subjective Information:  Subjective: Patient reports she is doing ok today, her shoulder is alright, no pain to report.  She states she tried her HEP but has not completed as much as she should  HEP Compliance:  [] Good [x] Fair [] Poor [] Reports not doing due to:    Pain Screening  Patient Currently in Pain: Denies    Treatment:  Exercises:  Exercises  Exercise 1: wall slides into flexion and scaption 1 set x 10 reps x 10 sec holds  Exercise 2: UBE L2 x5 min (2.5 min fwd & 2.5 min retro)  Exercise 4: 4-way ball on wall 1 set x 30sec each direction  Exercise 8: shoulder isometric strengthening flexion & abduction 1 set x 10reps x 10sec holds RUE only  Exercise 20: HEP: cont current + corner str & wall slides       Manual:   Manual Therapy  Joint Mobilization: PROM w/ distraction flexion, abduction and Grade 2-3 AP and inferior joint mobs x15 min       Modalities:  Moist Heat (CPT 78925)  Patient Position: Seated  Number Minutes Moist Heat: 10  Moist heat location: Right, Shoulder  Post treatment skin assessment: Intact       *Indicates exercise, modality, or manual techniques to be initiated when appropriate    Objective Measures:      Strength: [x] NT  [] MMT completed:     ROM: [] NT  [] ROM measurements:    Firm end-feel noted in R shoulder joint capsule during joint mobs and PROM. Assessment: Body Structures, Functions, Activity Limitations Requiring Skilled Therapeutic Intervention: Decreased ADL status, Decreased functional mobility , Decreased ROM, Decreased strength, Increased pain  Assessment: Patient presented to therapy with improvements in R shoulder pain. Patient was able to progress mobility activities to include UBE as well as progressing table slides to wall slides to further progress R shoulder ROM and improve tissue extensibility limitations. Cont with PROM w/ distraction as well as introducing joint mobs to further progress mobility in the R shoulder joint capsule with firm joint capsule noted. Cont to progress mobility and strengthening as tolerated. Treatment Diagnosis: Decreased R shoulder ROM, decreased R shoulder strength, increased pain, decreased ADL status  Therapy Prognosis: Good          Post-Pain Assessment:       Pain Rating (0-10 pain scale):   0/10   Location and pain description same as pre-treatment unless indicated.    Action: [] NA   [x] Perform HEP  [] Meds as prescribed  [] Modalities as prescribed   [] Call Physician     GOALS   Patient Goal(s): Patient goals : \"strengthen right arm\"    Short Term Goals Completed by 2 weeks Goal Status   STG 1 Patient will be indep with HEP In progress     Long Term Goals Completed by 6-8 weeks Goal Status   LTG 1 Patient will improve R shoulder ROM by >/=20 deg to improve mechanics with brushing hair and getting dressed In progress   LTG 2 Patient will increase R shoulder strength to 5/5 to equal opposite side to improve lifting mechanics during house work In progress   LTG 3 Patient will improve UEFI by >/=10 points to correlate to [employer] to demonstrate improved QOL In progress Plan:  Frequency/Duration:  Plan  Plan Frequency: 1x  Plan weeks: 6-8 weeks  Current Treatment Recommendations: Strengthening, ROM, Manual Therapy - Soft Tissue Mobilization, Neuromuscular re-education, Manual Therapy - Joint Manipulation, Pain management, Return to work related activity, Home exercise program, Safety education & training, Modalities, Integrated dry needling, Therapeutic activities  Pt to continue current HEP. See objective section for any therapeutic exercise changes, additions or modifications this date.     Therapy Time:      PT Individual Minutes  Time In: 6490  Time Out: 1423  Minutes: 39  Timed Code Treatment Minutes: 39 Minutes  Procedure Minutes:  Timed Activity Minutes Units   Ther Ex 24 2   Manual  15 1     Electronically signed by Tena Cole PT on 9/27/22 at 3:28 PM EDT

## 2022-10-04 ENCOUNTER — HOSPITAL ENCOUNTER (OUTPATIENT)
Dept: PHYSICAL THERAPY | Age: 62
Setting detail: THERAPIES SERIES
Discharge: HOME | End: 2022-10-04
Payer: COMMERCIAL

## 2022-10-04 PROCEDURE — 97110 THERAPEUTIC EXERCISES: CPT

## 2022-10-04 ASSESSMENT — PAIN DESCRIPTION - DESCRIPTORS: DESCRIPTORS: DULL

## 2022-10-04 ASSESSMENT — PAIN DESCRIPTION - PAIN TYPE: TYPE: CHRONIC PAIN

## 2022-10-04 ASSESSMENT — PAIN SCALES - GENERAL: PAINLEVEL_OUTOF10: 4

## 2022-10-04 ASSESSMENT — PAIN DESCRIPTION - ORIENTATION: ORIENTATION: RIGHT

## 2022-10-04 ASSESSMENT — PAIN DESCRIPTION - LOCATION: LOCATION: SHOULDER;NECK;HEAD

## 2022-10-04 ASSESSMENT — PAIN DESCRIPTION - FREQUENCY: FREQUENCY: CONTINUOUS

## 2022-10-04 NOTE — PROGRESS NOTES
[facility]  Outpatient Physical Therapy    Treatment Note        Date: 10/4/2022  Patient: Basilia Chung  : 1960   Confirmed: Yes  MRN: [id number]  Referring Provider: GARY Oakes    Medical Diagnosis: Strain of muscle(s) and tendon(s) of the rotator cuff of right shoulder, initial encounter [S46.011A]       Treatment Diagnosis: Decreased R shoulder ROM, decreased R shoulder strength, increased pain, decreased ADL status    Visit Information:  Insurance: Payor: Shanel Beal / Plan: Tara Balderas CINTHIA / Product Type: *No Product type* /   PT Visit Information  Total # of Visits Approved: 10 (-2022)  Total # of Visits to Date: 3  Plan of Care/Certification Expiration Date: 22  No Show: 0  Progress Note Due Date: 22  Canceled Appointment: 0  Progress Note Counter: 3/10 (-)    Subjective Information:  Subjective: Patient reports she is having a little more pain today and that it is in her head and neck as well as shoulder. Pt states \"The hardest thing for me to do right now is stirring a pot or brushing my teeth. \"  HEP Compliance:  [] Good [] Fair [] Poor [x] Reports not doing due to: going on trip for anniversary    Pain Screening  Patient Currently in Pain: Yes  Pain Assessment: 0-10  Pain Level: 4  Pain Type: Chronic pain  Pain Location: Shoulder, Neck, Head  Pain Orientation: Right  Pain Descriptors: Dull  Pain Frequency: Continuous    Treatment:  Exercises:  Exercises  Exercise 1: wall slides into flexion and scaption 1 set x 10 reps x 10 sec holds  Exercise 2: UBE L2 x6 min (3 min fwd & 3 min retro)  Exercise 3: pulleys flx x2 min  Exercise 4: 4-way ball on wall 1 set x 30sec each direction  Exercise 5: Standing I, Y,T x10  Exercise 6: Rows / lat pull downs RTB x10 reps x2 sets  Exercise 8: shoulder isometric strengthening flexion & abduction 1 set x 10reps x 15sec holds RUE only         Modalities:  Moist Heat (CPT 76434)  Patient Position: Seated  Number Minutes Moist Heat: 10  Moist heat location: Right, Shoulder  Post treatment skin assessment: Intact       *Indicates exercise, modality, or manual techniques to be initiated when appropriate    Objective Measures:       Strength: [x] NT  [] MMT completed:     ROM: [x] NT  [] ROM measurements:      Assessment: Body Structures, Functions, Activity Limitations Requiring Skilled Therapeutic Intervention: Decreased ADL status, Decreased functional mobility , Decreased ROM, Decreased strength, Increased pain  Assessment: Pt demonstrates an improvement in strength and progression toward goals secondary to tolerance of performing new exercises this session. Multiple cues required throughout tx for proper technique for max benefit of there ex. Recommend continue on with current HEP d/t pt not able to perform from last visit. Concluded tx this date with moist heat pack for pain relief. Treatment Diagnosis: Decreased R shoulder ROM, decreased R shoulder strength, increased pain, decreased ADL status  Therapy Prognosis: Good          Post-Pain Assessment:       Pain Rating (0-10 pain scale):   4/10   Location and pain description same as pre-treatment unless indicated.    Action: [] NA   [x] Perform HEP  [] Meds as prescribed  [] Modalities as prescribed   [] Call Physician     GOALS   Patient Goal(s): Patient Goals : \"strengthen right arm\"    Short Term Goals Completed by 2 weeks Goal Status   STG 1 Patient will be indep with HEP In progress       Long Term Goals Completed by 6-8 weeks Goal Status   LTG 1 Patient will improve R shoulder ROM by >/=20 deg to improve mechanics with brushing hair and getting dressed In progress   LTG 2 Patient will increase R shoulder strength to 5/5 to equal opposite side to improve lifting mechanics during house work In progress   LTG 3 Patient will improve UEFI by >/=10 points to correlate to [employer] to demonstrate improved QOL In progress Plan:  Frequency/Duration:  Plan  Plan Frequency: 1x  Plan weeks: 6-8 weeks  Current Treatment Recommendations: Strengthening, ROM, Manual Therapy - Soft Tissue Mobilization, Neuromuscular re-education, Manual Therapy - Joint Manipulation, Pain management, Return to work related activity, Home exercise program, Safety education & training, Modalities, Integrated dry needling, Therapeutic activities  Pt to continue current HEP. See objective section for any therapeutic exercise changes, additions or modifications this date.     Therapy Time:      PT Individual Minutes  Time In: 9598  Time Out: 1430  Minutes: 48  Timed Code Treatment Minutes: 38 Minutes  Procedure Minutes:10 MHP  Timed Activity Minutes Units   Ther Ex 38 3     Electronically signed by Amy Horne PTA on 10/4/22 at 1:49 PM EDT

## 2022-10-11 ENCOUNTER — HOSPITAL ENCOUNTER (OUTPATIENT)
Dept: PHYSICAL THERAPY | Age: 62
Setting detail: THERAPIES SERIES
Discharge: HOME | End: 2022-10-11
Payer: COMMERCIAL

## 2022-10-11 PROCEDURE — 97110 THERAPEUTIC EXERCISES: CPT

## 2022-10-11 ASSESSMENT — PAIN DESCRIPTION - PAIN TYPE: TYPE: CHRONIC PAIN

## 2022-10-11 ASSESSMENT — PAIN DESCRIPTION - DESCRIPTORS: DESCRIPTORS: ACHING

## 2022-10-11 ASSESSMENT — PAIN DESCRIPTION - LOCATION: LOCATION: SHOULDER;NECK

## 2022-10-11 ASSESSMENT — PAIN SCALES - GENERAL: PAINLEVEL_OUTOF10: 5

## 2022-10-11 ASSESSMENT — PAIN DESCRIPTION - ORIENTATION: ORIENTATION: RIGHT

## 2022-10-11 NOTE — PROGRESS NOTES
[facility]  Outpatient Physical Therapy    Treatment Note        Date: 10/11/2022  Patient: Janie Rosales  : 1960   Confirmed: Yes  MRN: [id number]  Referring Provider: GARY Frankel    Medical Diagnosis: Strain of muscle(s) and tendon(s) of the rotator cuff of right shoulder, initial encounter [S46.011A]       Treatment Diagnosis: Decreased R shoulder ROM, decreased R shoulder strength, increased pain, decreased ADL status    Visit Information:  Insurance: Payor: Dheerajwunderloop / Plan: Maryjane Olivares CINTHIA / Product Type: *No Product type* /   PT Visit Information  Total # of Visits Approved: 10 (-2022)  Total # of Visits to Date: 4  Plan of Care/Certification Expiration Date: 22  No Show: 0  Progress Note Due Date: 22  Canceled Appointment: 0  Progress Note Counter: 4/10 (-)    Subjective Information:  Subjective: Patient reports she is in more pain today. states she has been hanging alot of clothes and unsure if the repetitive motion is causing the pain.  Inconsistent with HEP  HEP Compliance:  [x] Good [] Fair [] Poor [] Reports not doing due to:    Pain Screening  Patient Currently in Pain: Yes  Pain Level: 5  Pain Type: Chronic pain  Pain Location: Shoulder, Neck  Pain Orientation: Right  Pain Descriptors: Aching    Treatment:  Exercises:  Exercises  Exercise 3: pulleys flex and scaption x2.5 min  Exercise 6: Rows & shoulder ext RTB 3 sets x 10 reps each  Exercise 7: IR stretch with strap; ER with cane 1 set x 10 reps x15 sec holds  Exercise 9: scapular punches 3 sets x 10 reps  Exercise 10: supine D1-D2 diagonals YTB RUE only 1 set x 15 reps each  Exercise 20: HEP: cont current + IR stretch & ER cane & diagonals      *Indicates exercise, modality, or manual techniques to be initiated when appropriate    Objective Measures:    Strength: [x] NT  [] MMT completed:     ROM: [] NT  [x] ROM measurements:         AROM RUE (degrees)  R Shoulder Flexion 0-180: 136  R Shoulder ABduction 0-180: 127  R Shoulder Int Rotation  0-70: T7  R Shoulder Ext Rotation 0-90: C7-T1      Assessment: Body Structures, Functions, Activity Limitations Requiring Skilled Therapeutic Intervention: Decreased ADL status, Decreased functional mobility , Decreased ROM, Decreased strength, Increased pain  Assessment: Patient arrived 8 min late to appointment, unable to accomodate for time this session. Patient tolerated progression of activities this session to include increased reps, sets and inclusion of diagonal patterns. Patient demonstrated improvements in R shoulder flexion and abduction compared to previous measurements, however IR and ER stayed about the same. Included IR/ER stretch to improve tissue capsule tighteness and increase functional mobility. cont per poc for further progression of activities as tolerated. Treatment Diagnosis: Decreased R shoulder ROM, decreased R shoulder strength, increased pain, decreased ADL status  Therapy Prognosis: Good          Post-Pain Assessment:       Pain Rating (0-10 pain scale):  0 /10   Location and pain description same as pre-treatment unless indicated.    Action: [] NA   [x] Perform HEP  [] Meds as prescribed  [] Modalities as prescribed   [] Call Physician     GOALS   Patient Goal(s): Patient Goals : \"strengthen right arm\"    Short Term Goals Completed by 2 weeks Goal Status   STG 1 Patient will be indep with HEP In progress     Long Term Goals Completed by 6-8 weeks Goal Status   LTG 1 Patient will improve R shoulder ROM by >/=20 deg to improve mechanics with brushing hair and getting dressed In progress   LTG 2 Patient will increase R shoulder strength to 5/5 to equal opposite side to improve lifting mechanics during house work In progress   LTG 3 Patient will improve UEFI by >/=10 points to correlate to [employer] to demonstrate improved QOL In progress          Plan:  Frequency/Duration:  Plan  Plan Frequency: 1x  Plan weeks: 6-8 weeks  Current Treatment Recommendations: Strengthening, ROM, Manual Therapy - Soft Tissue Mobilization, Neuromuscular re-education, Manual Therapy - Joint Manipulation, Pain management, Return to work related activity, Home exercise program, Safety education & training, Modalities, Integrated dry needling, Therapeutic activities  Pt to continue current HEP. See objective section for any therapeutic exercise changes, additions or modifications this date.     Therapy Time:      PT Individual Minutes  Time In: 1493  Time Out: 4465  Minutes: 35  Timed Code Treatment Minutes: 35 Minutes  Procedure Minutes:  Timed Activity Minutes Units   Ther Ex 35 2     Electronically signed by Ana Cristina Hernandez PT on 10/11/22 at 2:40 PM EDT

## 2022-10-13 ENCOUNTER — OFFICE VISIT (OUTPATIENT)
Dept: ORTHOPEDIC SURGERY | Age: 62
End: 2022-10-13
Payer: COMMERCIAL

## 2022-10-13 VITALS — HEIGHT: 67 IN | HEART RATE: 77 BPM | BODY MASS INDEX: 41.5 KG/M2 | TEMPERATURE: 97.7 F | OXYGEN SATURATION: 98 %

## 2022-10-13 DIAGNOSIS — S46.011D: ICD-10-CM

## 2022-10-13 DIAGNOSIS — M25.462 KNEE EFFUSION, LEFT: ICD-10-CM

## 2022-10-13 DIAGNOSIS — S83.412A SPRAIN OF MEDIAL COLLATERAL LIGAMENT OF LEFT KNEE, INITIAL ENCOUNTER: Primary | ICD-10-CM

## 2022-10-13 PROCEDURE — 99214 OFFICE O/P EST MOD 30 MIN: CPT | Performed by: PHYSICIAN ASSISTANT

## 2022-10-14 ASSESSMENT — ENCOUNTER SYMPTOMS
RESPIRATORY NEGATIVE: 1
EYES NEGATIVE: 1
GASTROINTESTINAL NEGATIVE: 1

## 2022-10-14 NOTE — PROGRESS NOTES
Stephanie Hunter (:  1960) is a 64 y.o. female,Established patient, here for evaluation of the following chief complaint(s):  Follow-up (Left knee pain F/U)         ASSESSMENT/PLAN:  1. Sprain of medial collateral ligament of left knee, initial encounter  2. Strain of rotator cuff capsule, right, subsequent encounter  3. Knee effusion, left      No follow-ups on file. Subjective   SUBJECTIVE/OBJECTIVE:  Is a 59-year-old female following up for complaint of left knee and right shoulder pain. She been participating in physical therapy for the shoulder and the symptoms have improved. I aspirated and injected her left knee at her last visit. States her knee is doing much better. She is overall much improved. She denies instability or locking of the knee. She states her shoulder range of motion has improved significantly. Review of Systems   Constitutional: Negative. HENT: Negative. Eyes: Negative. Respiratory: Negative. Gastrointestinal: Negative. Genitourinary: Negative. Musculoskeletal: Negative. Psychiatric/Behavioral: Negative. Objective   Physical Exam  Constitutional:       Appearance: Normal appearance. HENT:      Head: Normocephalic and atraumatic. Mouth/Throat:      Mouth: Mucous membranes are moist.   Eyes:      Extraocular Movements: Extraocular movements intact. Musculoskeletal:      Cervical back: Normal range of motion. Comments: Right shoulder-no acromioclavicular, clavicle, SC joint tenderness with palpation. Abduction and abduction strength is strong and symmetrical.  Internal rotation is 0 degrees, external rotation is symmetrical 125 degrees. Supraspinatus test elicits very mild pain but no weakness. Patient reaches T10 behind her back with her right arm, T8 behind her back with her left. Biceps contour is normal.  Sensation is intact distally to light touch. Left knee-resolving ecchymosis medial aspect of the knee joint. Full extension, flexion to 125 degrees. The knee joint is stable, there is no laxity with valgus or varus stress. No tenderness with palpation over the medial aspect of the joint not specific to the joint line. Lachman's is negative, Hever's does elicit medial joint pain but not specific to the joint line. Calf is soft and nontender. Skin:     General: Skin is warm and dry. Neurological:      General: No focal deficit present. Mental Status: She is oriented to person, place, and time. Psychiatric:         Mood and Affect: Mood normal.     I discussed some quadriceps and hamstring strengthening exercises with the patient. She is improving. I told her to be another couple weeks before her symptoms have entirely resolved. I like her to continue in physical therapy at this time. She is to follow-up with me in about 6 weeks. On this date 10/13/2022 I have spent 35 minutes reviewing previous notes, test results and face to face with the patient discussing the diagnosis and importance of compliance with the treatment plan as well as documenting on the day of the visit. An electronic signature was used to authenticate this note.     --GARY Gupta

## 2022-10-18 ENCOUNTER — HOSPITAL ENCOUNTER (OUTPATIENT)
Dept: PHYSICAL THERAPY | Age: 62
Setting detail: THERAPIES SERIES
Discharge: HOME | End: 2022-10-18
Payer: COMMERCIAL

## 2022-10-18 PROCEDURE — 97110 THERAPEUTIC EXERCISES: CPT

## 2022-10-18 ASSESSMENT — PAIN DESCRIPTION - LOCATION: LOCATION: SHOULDER

## 2022-10-18 ASSESSMENT — PAIN SCALES - GENERAL: PAINLEVEL_OUTOF10: 2

## 2022-10-18 ASSESSMENT — PAIN DESCRIPTION - PAIN TYPE: TYPE: CHRONIC PAIN

## 2022-10-18 ASSESSMENT — PAIN DESCRIPTION - ORIENTATION: ORIENTATION: RIGHT

## 2022-10-18 NOTE — PROGRESS NOTES
[facility]  Outpatient Physical Therapy    Treatment Note        Date: 10/18/2022  Patient: Kristin Murray  : 1960   Confirmed: Yes  MRN: [id number]  Referring Provider: GARY Woodward    Medical Diagnosis: Strain of muscle(s) and tendon(s) of the rotator cuff of right shoulder, initial encounter [S46.011A]       Treatment Diagnosis: Decreased R shoulder ROM, decreased R shoulder strength, increased pain, decreased ADL status    Visit Information:  Insurance: Payor: Jennifer Kennedy / Plan: Natty Bernard CINTHIA / Product Type: *No Product type* /   PT Visit Information  Total # of Visits Approved: 10 (-2022)  Total # of Visits to Date: 5  Plan of Care/Certification Expiration Date: 22  No Show: 0  Progress Note Due Date: 22  Canceled Appointment: 0  Progress Note Counter: 5/10 (-)    Subjective Information:  Subjective: Patient reports she feels like her shoulder is feeling better, still sore but feels like there is improvement  HEP Compliance:  [] Good [x] Fair [] Poor [] Reports not doing due to:    Pain Screening  Patient Currently in Pain: Yes  Pain Level: 2  Pain Type: Chronic pain  Pain Location: Shoulder  Pain Orientation: Right    Treatment:  Exercises:  Exercises  Exercise 2: UBE L2 x5 min (2.5 min fwd & 2.5 min retro)  Exercise 4: 4-way ball on wall 1 set x 30sec each direction  Exercise 6: Rows & shoulder ext RTB 3 sets x 10 reps each  Exercise 7: IR stretch with strap 5 sets x 20 sec holds  Exercise 11: 3-way number wall  2set x 5reps YTB claudia  Exercise 12: chest pulls 3 setx 10 reps YTB  Exercise 13: supine flexion & abduction w/ cane 1 set x 10 reps x 10 sec holds  Exercise 20: HEP: cont current     *Indicates exercise, modality, or manual techniques to be initiated when appropriate    Objective Measures:    Strength: [] NT  [x] MMT completed:        Strength RUE  R Shoulder Flexion: 4+/5  R Shoulder ABduction: 4+/5          ROM: [x] NT  [] ROM measurements:     Assessment: Body Structures, Functions, Activity Limitations Requiring Skilled Therapeutic Intervention: Decreased ADL status, Decreased functional mobility , Decreased ROM, Decreased strength, Increased pain  Assessment: Patient was 5 min late for appointment. Cont with current strengthening and mobility, with progression of activity. Patient tolerated treatment well this date. Included cane flexion and abduction in supine to help assisst in capsule tightness and improve mobility during elevation. Cont to progress as tolerated for further improvements. Treatment Diagnosis: Decreased R shoulder ROM, decreased R shoulder strength, increased pain, decreased ADL status  Therapy Prognosis: Good       Post-Pain Assessment:       Pain Rating (0-10 pain scale): 0  /10   Location and pain description same as pre-treatment unless indicated.    Action: [] NA   [x] Perform HEP  [] Meds as prescribed  [] Modalities as prescribed   [] Call Physician     GOALS   Patient Goal(s): Patient Goals : \"strengthen right arm\"    Short Term Goals Completed by 2 weeks Goal Status   STG 1 Patient will be indep with HEP In progress     Long Term Goals Completed by 6-8 weeks Goal Status   LTG 1 Patient will improve R shoulder ROM by >/=20 deg to improve mechanics with brushing hair and getting dressed In progress   LTG 2 Patient will increase R shoulder strength to 5/5 to equal opposite side to improve lifting mechanics during house work In progress   LTG 3 Patient will improve UEFI by >/=10 points to correlate to [employer] to demonstrate improved QOL In progress          Plan:  Frequency/Duration:  Plan  Plan Frequency: 1x  Plan weeks: 6-8 weeks  Current Treatment Recommendations: Strengthening, ROM, Manual Therapy - Soft Tissue Mobilization, Neuromuscular re-education, Manual Therapy - Joint Manipulation, Pain management, Return to work related activity, Home exercise program, Safety education & training, Modalities, Integrated dry needling, Therapeutic activities  Pt to continue current HEP. See objective section for any therapeutic exercise changes, additions or modifications this date.     Therapy Time:      PT Individual Minutes  Time In: 1842  Time Out: 8988  Minutes: 35  Timed Code Treatment Minutes: 35 Minutes  Procedure Minutes:  Timed Activity Minutes Units   Ther Ex 35 2     Electronically signed by Miller Soliz PT on 10/18/22 at 2:25 PM EDT

## 2022-10-26 ENCOUNTER — HOSPITAL ENCOUNTER (OUTPATIENT)
Dept: PHYSICAL THERAPY | Age: 62
Setting detail: THERAPIES SERIES
Discharge: HOME | End: 2022-10-26
Payer: COMMERCIAL

## 2022-10-26 PROCEDURE — 97110 THERAPEUTIC EXERCISES: CPT

## 2022-10-26 ASSESSMENT — PAIN DESCRIPTION - LOCATION: LOCATION: SHOULDER

## 2022-10-26 ASSESSMENT — PAIN SCALES - GENERAL: PAINLEVEL_OUTOF10: 3

## 2022-10-26 ASSESSMENT — PAIN DESCRIPTION - ORIENTATION: ORIENTATION: RIGHT

## 2022-10-26 ASSESSMENT — PAIN DESCRIPTION - PAIN TYPE: TYPE: CHRONIC PAIN

## 2022-10-26 ASSESSMENT — PAIN DESCRIPTION - DESCRIPTORS: DESCRIPTORS: ACHING;SORE

## 2022-10-26 NOTE — PROGRESS NOTES
[facility]  Outpatient Physical Therapy    Treatment Note        Date: 10/26/2022  Patient: Reinaldo Vargas  : 1960   Confirmed: Yes  MRN: [id number]  Referring Provider: GARY Ortega    Medical Diagnosis: Strain of muscle(s) and tendon(s) of the rotator cuff of right shoulder, initial encounter [S46.011A]       Treatment Diagnosis: Decreased R shoulder ROM, decreased R shoulder strength, increased pain, decreased ADL status    Visit Information:  Insurance: Payor: Syeda Arredondo / Plan: Grant Player CINTHIA / Product Type: *No Product type* /   PT Visit Information  Total # of Visits Approved: 10 (-2022)  Total # of Visits to Date: 6  Plan of Care/Certification Expiration Date: 22  No Show: 0  Progress Note Due Date: 22  Canceled Appointment: 0  Progress Note Counter: 6/10 (-)    Subjective Information:  Subjective: Reports compliance with HEP, states her shoulder is a little more sore today, likely d/t the weather  HEP Compliance:  [x] Good [] Fair [] Poor [] Reports not doing due to:    Pain Screening  Patient Currently in Pain: Yes  Pain Level: 3  Pain Type: Chronic pain  Pain Location: Shoulder  Pain Orientation: Right  Pain Descriptors: Aching, Sore    Treatment:  Exercises:  Exercises  Exercise 1: S/L shoulder ER/IR  2 sets x 15 reps 2# RUE only  Exercise 2: UBE L2.5 x5 min (2.5 min fwd & 2.5 min retro)  Exercise 4: 4-way ball on wall 1 set x 30sec each direction  Exercise 8: S/L shoulder Abduction & supine shoulder flexion 2 sets x 15 reps 2# RUE only  Exercise 9: scapular punches 2 sets x 15 reps 2#, RUE only  Exercise 11: 3-way number wall  2set x 5reps YTB claudia  Exercise 14: supine w/ shoulder at 90deg: forearm supination & pronation 2 sets x 20 reps 2#  Exercise 15: supine shoulder circles w/ shoulder at 90 deg 2 sets x 10 reps cw/ccw 2#  Exercise 20: HEP: cont current+ sidelying ER/IR & abduction & flexion & shoulder circles       *Indicates exercise, modality, or manual techniques to be initiated when appropriate    Objective Measures:      Strength: [x] NT  [] MMT completed:     ROM: [] NT  [] ROM measurements:         AROM RUE (degrees)  R Shoulder Flexion 0-180: 125  R Shoulder ABduction 0-180: 120        Assessment: Body Structures, Functions, Activity Limitations Requiring Skilled Therapeutic Intervention: Decreased ADL status, Decreased functional mobility , Decreased ROM, Decreased strength, Increased pain  Assessment: Cont with scapular and shoulder strenghtening and stabilization this date to further progress lifting/carrying and general mobility during ADL's . Patient c/o of cont difficulty with brushing teetch and stirring activities, initiated exercises to help mimic those movements and improve strength in stated musculature. Progress activities this date with patient compliance, with not increase in familiar pain symptoms this date. Patient however, demo'd muscular fatigue during end or sets this date. decrease in elevation ROM this date, likely d/t inc soreness from the weather. Cont to progress as tolerated. Treatment Diagnosis: Decreased R shoulder ROM, decreased R shoulder strength, increased pain, decreased ADL status  Therapy Prognosis: Good     Post-Pain Assessment:       Pain Rating (0-10 pain scale):  \"better\" /10   Location and pain description same as pre-treatment unless indicated.    Action: [] NA   [x] Perform HEP  [] Meds as prescribed  [] Modalities as prescribed   [] Call Physician     GOALS   Patient Goal(s): Patient Goals : \"strengthen right arm\"    Short Term Goals Completed by 2 weeks Goal Status   STG 1 Patient will be indep with HEP In progress     Long Term Goals Completed by 6-8 weeks Goal Status   LTG 1 Patient will improve R shoulder ROM by >/=20 deg to improve mechanics with brushing hair and getting dressed In progress   LTG 2 Patient will increase R shoulder strength to 5/5 to equal opposite side to improve lifting mechanics during house work In progress   LTG 3 Patient will improve UEFI by >/=10 points to correlate to [employer] to demonstrate improved QOL In progress          Plan:  Frequency/Duration:  Plan  Plan Frequency: 1x  Plan weeks: 6-8 weeks  Current Treatment Recommendations: Strengthening, ROM, Manual Therapy - Soft Tissue Mobilization, Neuromuscular re-education, Manual Therapy - Joint Manipulation, Pain management, Return to work related activity, Home exercise program, Safety education & training, Modalities, Integrated dry needling, Therapeutic activities  Pt to continue current HEP. See objective section for any therapeutic exercise changes, additions or modifications this date.     Therapy Time:      PT Individual Minutes  Time In: 7685  Time Out: 5315  Minutes: 38  Timed Code Treatment Minutes: 38 Minutes  Procedure Minutes:  Timed Activity Minutes Units   Ther Ex 38 3     Electronically signed by Svitlana Burciaga PT on 10/26/22 at 1:59 PM EDT

## 2022-11-02 ENCOUNTER — HOSPITAL ENCOUNTER (OUTPATIENT)
Dept: PHYSICAL THERAPY | Age: 62
Setting detail: THERAPIES SERIES
Discharge: HOME | End: 2022-11-02
Payer: COMMERCIAL

## 2022-11-02 PROCEDURE — 97110 THERAPEUTIC EXERCISES: CPT

## 2022-11-02 ASSESSMENT — PAIN SCALES - GENERAL: PAINLEVEL_OUTOF10: 2

## 2022-11-02 ASSESSMENT — PAIN DESCRIPTION - LOCATION: LOCATION: SHOULDER

## 2022-11-02 ASSESSMENT — PAIN DESCRIPTION - ORIENTATION: ORIENTATION: RIGHT

## 2022-11-02 ASSESSMENT — PAIN DESCRIPTION - DESCRIPTORS: DESCRIPTORS: SORE

## 2022-11-02 NOTE — PROGRESS NOTES
[facility]  Outpatient Physical Therapy    Treatment Note        Date: 2022  Patient: Horacio Lo  : 1960   Confirmed: Yes  MRN: [id number]  Referring Provider: GARY Felipe    Medical Diagnosis: Strain of muscle(s) and tendon(s) of the rotator cuff of right shoulder, initial encounter [S46.011A]       Treatment Diagnosis: Decreased R shoulder ROM, decreased R shoulder strength, increased pain, decreased ADL status    Visit Information:  Insurance: Payor: Beau Morales / Plan: Peace Mustache CINTHIA / Product Type: *No Product type* /   PT Visit Information  Total # of Visits Approved: 10 (-2022)  Total # of Visits to Date: 7  Plan of Care/Certification Expiration Date: 22  No Show: 0  Progress Note Due Date: 22  Canceled Appointment: 0  Progress Note Counter: 7/10 (-)    Subjective Information:  Subjective: Patient states she has a headache and arm is a little sore today, thinks it is from helping a friend load and unload groceries  HEP Compliance:  [x] Good [] Fair [] Poor [] Reports not doing due to:    Pain Screening  Patient Currently in Pain: Yes  Pain Assessment: 0-10  Pain Level: 2  Pain Location: Shoulder  Pain Orientation: Right  Pain Descriptors: Sore    Treatment:  Exercises:  Exercises  Exercise 2: UBE L2.5 x5 min (2.5 min fwd & 2.5 min retro)  Exercise 3: seated claudia ER w/ RTB 1 set x 20 reps  Exercise 6: Rows & shoulder Ext & \"W\" RTB 2 sets x 15 reps each  Exercise 9: scapular punches 3 sets x 15 reps 2#, RUE only  Exercise 10: supine D1-D2 diagonals RTB RUE only 2 set x 15 reps each  Exercise 14: supine w/ shoulder at 90deg: forearm supination & pronation 3 sets x 15 reps 2#  Exercise 15: supine shoulder circles w/ shoulder at 90 deg 3 sets x 15 reps cw/ccw 2#  Exercise 16: standing wall slides into flexion & abduction 1 set x 10 reps x 10 sec holds  Exercise 17: serratus wall slides 2 sets x 10 reps YTB  Exercise 18: Corner Pec Str 1 set x 5 reps x 20 sec holds  Exercise 20: HEP: cont current + serratus wall slides     *Indicates exercise, modality, or manual techniques to be initiated when appropriate    Objective Measures:      Strength: [x] NT  [] MMT completed:     ROM: [x] NT  [] ROM measurements:     Assessment: Body Structures, Functions, Activity Limitations Requiring Skilled Therapeutic Intervention: Decreased ADL status, Decreased functional mobility , Decreased ROM, Decreased strength, Increased pain  Assessment: Cont with poc this date, focusing on strengthening of R shoulder. Patient tolerated progression of current activity this date with increased reps, sets and resistance. Patient demo'd muscular fatigue and appropriate muscle soreness this date without inc in familiar pain s/s. Paitent required minimal verbal cuing on appropare mechanics to recieve best benefit from appointed activities. Pt to d/c NV. Treatment Diagnosis: Decreased R shoulder ROM, decreased R shoulder strength, increased pain, decreased ADL status  Therapy Prognosis: Good       Post-Pain Assessment:       Pain Rating (0-10 pain scale):  \"no pain, just sore\" /10   Location and pain description same as pre-treatment unless indicated.    Action: [] NA   [x] Perform HEP  [] Meds as prescribed  [] Modalities as prescribed   [] Call Physician     GOALS   Patient Goal(s): Patient Goals : \"strengthen right arm\"    Short Term Goals Completed by 2 weeks Goal Status   STG 1 Patient will be indep with HEP In progress     Long Term Goals Completed by 6-8 weeks Goal Status   LTG 1 Patient will improve R shoulder ROM by >/=20 deg to improve mechanics with brushing hair and getting dressed In progress   LTG 2 Patient will increase R shoulder strength to 5/5 to equal opposite side to improve lifting mechanics during house work In progress   LTG 3 Patient will improve UEFI by >/=10 points to correlate to [employer] to demonstrate improved QOL In progress Plan:  Frequency/Duration:  Plan  Plan Frequency: 1x  Plan weeks: 6-8 weeks  Current Treatment Recommendations: Strengthening, ROM, Manual Therapy - Soft Tissue Mobilization, Neuromuscular re-education, Manual Therapy - Joint Manipulation, Pain management, Return to work related activity, Home exercise program, Safety education & training, Modalities, Integrated dry needling, Therapeutic activities  Additional Comments: ESPERANZA AZAR 11/8  Pt to continue current HEP. See objective section for any therapeutic exercise changes, additions or modifications this date.     Therapy Time:      PT Individual Minutes  Time In: 1406  Time Out: 8122  Minutes: 39  Timed Code Treatment Minutes: 39 Minutes  Procedure Minutes:  *Patient arrived 6 min late - able to accommodate*  Timed Activity Minutes Units   Ther Ex 39 3     Electronically signed by Lashell Rausch PT on 11/2/22 at 2:55 PM EDT

## 2022-11-08 ENCOUNTER — HOSPITAL ENCOUNTER (OUTPATIENT)
Dept: PHYSICAL THERAPY | Age: 62
Setting detail: THERAPIES SERIES
Discharge: HOME | End: 2022-11-08
Payer: COMMERCIAL

## 2022-11-08 PROCEDURE — 97110 THERAPEUTIC EXERCISES: CPT

## 2022-11-08 NOTE — PROGRESS NOTES
[facility]  Outpatient Physical Therapy    Treatment Note        Date: 2022  Patient: Jerson Vogel  : 1960   Confirmed: Yes  MRN: [id number]  Referring Provider: GARY Robbins    Medical Diagnosis: Strain of muscle(s) and tendon(s) of the rotator cuff of right shoulder, initial encounter [S46.011A]       Treatment Diagnosis: Decreased R shoulder ROM, decreased R shoulder strength, increased pain, decreased ADL status    Visit Information:  Insurance: Payor: Marielena Breed / Plan: Em Credit CINTHIA / Product Type: *No Product type* /   PT Visit Information  Total # of Visits Approved: 10 (-2022)  Total # of Visits to Date: 8  Plan of Care/Certification Expiration Date: 22  No Show: 0  Progress Note Due Date: 22  Canceled Appointment: 0  Progress Note Counter: 8/10 (-)    Subjective Information:  Subjective: patient reports she is doing good today, no pain. stated she is ready for d/c this date. HEP Compliance:  [x] Good [] Fair [] Poor [] Reports not doing due to:    Pain Screening  Patient Currently in Pain: Denies    Treatment:  Exercises:  Exercises  Exercise 1: objective measures  Exercise 2: UBE L3.0 x6 min (3 min fwd & 3 min retro)  Exercise 3: discussed with patient regarding current HEP, what to cont to complete, what to exercises to d/c, as well as how to progress activity indep.  included more strengthening HEP this date as well  Exercise 6: Rows & shoulder Ext & \"W\" RTB 1 sets x 10reps each  Exercise 10: standing D2 diagonals RTB RUE only 1 set x 10 reps each     *Indicates exercise, modality, or manual techniques to be initiated when appropriate    Objective Measures:      Strength: [] NT  [x] MMT completed:     Strength RUE  R Shoulder Flexion: 5/5  R Shoulder ABduction: 5/5  R Shoulder Internal Rotation: 5/5  R Shoulder External Rotation: 5/5  R Elbow Flexion: 5/5  R Elbow Extension: 5/5     ROM: [] NT  [x] ROM measurements:     AROM RUE (degrees)  R Shoulder Flexion 0-180: 135  R Shoulder ABduction 0-180: 130  R Shoulder Int Rotation  0-70: T7  R Shoulder Ext Rotation 0-90: C7-T1       UEFI: 65/80    Assessment: Body Structures, Functions, Activity Limitations Requiring Skilled Therapeutic Intervention: Decreased ADL status, Decreased functional mobility , Decreased ROM, Decreased strength, Increased pain  Assessment: Patient is a 63 yo female presenting to therapy for R shoulder pain. Patient completed a total of 8 appointments to date. Objective measures taken this date for discharge. patient presented with increased ROM, strength and overall functional mobility as presented by increased UEFI score and patient subjective information. Patient has met or partially met all goals this date and is understanding of current HEP and demonstrated good ability to continue indep at home upon d/c. Patient understanding and agreeable to current POC, patient d/c this date from skilled PT. Treatment Diagnosis: Decreased R shoulder ROM, decreased R shoulder strength, increased pain, decreased ADL status  Therapy Prognosis: Good     Post-Pain Assessment:       Pain Rating (0-10 pain scale):   0/10   Location and pain description same as pre-treatment unless indicated.    Action: [x] NA   [] Perform HEP  [] Meds as prescribed  [] Modalities as prescribed   [] Call Physician     GOALS   Patient Goal(s): Patient Goals : \"strengthen right arm\"    Short Term Goals Completed by 2 weeks Goal Status   STG 1 Patient will be indep with HEP Met     Long Term Goals Completed by 6-8 weeks Goal Status   LTG 1 Patient will improve R shoulder ROM by >/=20 deg to improve mechanics with brushing hair and getting dressed Partially met   LTG 2 Patient will increase R shoulder strength to 5/5 to equal opposite side to improve lifting mechanics during house work Met   LTG 3 Patient will improve UEFI by >/=10 points to correlate to [employer] to demonstrate improved QOL Met Plan:  Frequency/Duration:  Plan  Additional Comments: Discharge  Pt to continue current HEP. See objective section for any therapeutic exercise changes, additions or modifications this date.     Therapy Time:      PT Individual Minutes  Time In: 1120  Time Out: 1150  Minutes: 30  Timed Code Treatment Minutes: 30 Minutes  Procedure Minutes:  Timed Activity Minutes Units   Ther Ex 30 2     Electronically signed by Adele Rankin PT on 11/8/22 at 11:57 AM EST

## 2022-11-08 NOTE — PROGRESS NOTES
[address] [facility]     Ph: [phone]  Fax: [phone]      [] Certification  [] Recertification []  Plan of Care  [] Progress Note [x] Discharge      Referring Provider: GARY Owens     From:  Faina Rodriguez, PT,DPT  Patient: Rebecca Hollins (69 y.o. female) : 1960 Date: 2022  Medical Diagnosis: Strain of muscle(s) and tendon(s) of the rotator cuff of right shoulder, initial encounter [S46.011A]       Treatment Diagnosis: Decreased R shoulder ROM, decreased R shoulder strength, increased pain, decreased ADL status    Plan of Care/Certification Expiration Date: : 22   Progress Report Period from:  2022 to 2022    Visits to Date: 8 No Show: 0 Cancelled Appts: 0    OBJECTIVE:   Short Term Goals - Time Frame for Short Term Goals: 2 weeks    Goals Current/Discharge status  Status   Short Term Goal 1: Patient will be indep with HEP  Indep and understanding of current HEP Met     Long Term Goals - Time Frame for Long Term Goals : 6-8 weeks  Goals Current/ Discharge status Status   Long Term Goal 1: Patient will improve R shoulder ROM by >/=20 deg to improve mechanics with brushing hair and getting dressed AROM RUE (degrees)  R Shoulder Flexion 0-180: 135  R Shoulder ABduction 0-180: 130  R Shoulder Int Rotation  0-70: T7  R Shoulder Ext Rotation 0-90: C7-T1     R IR/ER = L  15-20 deg improvement for elevation compared to initial evaluation Partially met   Long Term Goal 2: Patient will increase R shoulder strength to 5/5 to equal opposite side to improve lifting mechanics during house work Strength RUE  R Shoulder Flexion: 5/5  R Shoulder ABduction: 5/5  R Shoulder Internal Rotation: 5/5  R Shoulder External Rotation: 5/5  R Elbow Flexion: 5/5  R Elbow Extension: 5/5  Met   Long Term Goal 3: Patient will improve UEFI by >/=10 points to correlate to [employer] to demonstrate improved QOL UEFI: 65/80    @ eval: 55/80 Met     Body Structures, Functions, Activity Limitations Requiring Skilled Therapeutic Intervention: Decreased ADL status, Decreased functional mobility , Decreased ROM, Decreased strength, Increased pain  Assessment: Patient is a 63 yo female presenting to therapy for R shoulder pain. Patient completed a total of 8 appointments to date. Objective measures taken this date for discharge. patient presented with increased ROM, strength and overall functional mobility as presented by increased UEFI score and patient subjective information. Patient has met or partially met all goals this date and is understanding of current HEP and demonstrated good ability to continue indep at home upon d/c. Patient understanding and agreeable to current POC, patient d/c this date from skilled PT. Therapy Prognosis: Good    PLAN:   Frequency/Duration:  Additional Comments: Discharge                   Patient Status:[] Continue/ Initiate plan of Care    [x] Discharge PT. Recommend pt continue with HEP. [] Additional visits requested, Please re-certify for additional visits:    [] Hold         Signature: Electronically signed by Stephanie Thomas PT on 11/8/22 at 11:59 AM EST      If you have any questions or concerns, please don't hesitate to call.   Thank you for your referral.

## 2022-11-16 ENCOUNTER — OFFICE VISIT (OUTPATIENT)
Dept: ORTHOPEDIC SURGERY | Age: 62
End: 2022-11-16
Payer: COMMERCIAL

## 2022-11-16 DIAGNOSIS — S83.412D SPRAIN OF MEDIAL COLLATERAL LIGAMENT OF LEFT KNEE, SUBSEQUENT ENCOUNTER: Primary | ICD-10-CM

## 2022-11-16 DIAGNOSIS — S46.011D: ICD-10-CM

## 2022-11-16 PROCEDURE — 99214 OFFICE O/P EST MOD 30 MIN: CPT | Performed by: PHYSICIAN ASSISTANT

## 2022-11-16 ASSESSMENT — ENCOUNTER SYMPTOMS
RESPIRATORY NEGATIVE: 1
GASTROINTESTINAL NEGATIVE: 1
EYES NEGATIVE: 1

## 2022-11-16 NOTE — PROGRESS NOTES
Stephanie Hunter (:  1960) is a 64 y.o. female,Established patient, here for evaluation of the following chief complaint(s):  Follow-up (Follow up visit for Sprain of medial collateral ligament of left knee)         ASSESSMENT/PLAN:  1. Sprain of medial collateral ligament of left knee, subsequent encounter  2. Strain of rotator cuff capsule, right, subsequent encounter      No follow-ups on file. Subjective   SUBJECTIVE/OBJECTIVE:  This is a 57-year-old female following up for complaints of right shoulder and left knee pain. She states both the shoulder and knee are improving significantly. She has been discharged from physical therapy for the shoulder. I injected her knee and shoulder with cortisone previously. She is happy with her progress. She denies change in sensation of the arm. She denies any instability or locking of the knee. Review of Systems   Constitutional: Negative. HENT: Negative. Eyes: Negative. Respiratory: Negative. Gastrointestinal: Negative. Genitourinary: Negative. Musculoskeletal: Negative. Psychiatric/Behavioral: Negative. Objective   Physical Exam  Musculoskeletal:      Comments: Left knee-very little joint effusion, small popliteal cyst.  Full extension, flexion is 120 degrees. Knee joint is stable, there is no laxity with valgus or varus stress. Tenderness with palpation at the medial femoral condyle. Patellofemoral crepitus with range of motion. Calf is soft and nontender. Right shoulder-no acromioclavicular, clavicle, SC joint tenderness with palpation. Abduction and abduction strength are strong and symmetrical.  Apprehension sign is negative. Supraspinatus test elicits very mild pain but no weakness. [place]'s test is negative. Liftoff is negative. Spurling's is negative. Biceps contour is normal.  Sensation is intact distally to light touch.      Explained to the patient she is to continue in her home exercise program.  She is to continue strengthening her rotator cuff as well as her quad and hamstrings. I recommended cycling. I recommended walking. She may take over-the-counter anti-inflammatory medications as needed. She is to follow-up with me as her symptoms dictate. On this date 11/16/2022 I have spent 33 minutes reviewing previous notes, test results and face to face with the patient discussing the diagnosis and importance of compliance with the treatment plan as well as documenting on the day of the visit. An electronic signature was used to authenticate this note.     --GARY Saunders

## 2023-05-05 DIAGNOSIS — I10 HYPERTENSION, UNSPECIFIED TYPE: ICD-10-CM

## 2023-05-07 RX ORDER — LISINOPRIL 20 MG/1
TABLET ORAL
Qty: 90 TABLET | Refills: 1 | Status: SHIPPED | OUTPATIENT
Start: 2023-05-07 | End: 2023-11-09

## 2023-05-08 NOTE — TELEPHONE ENCOUNTER
Please schedule an appointment for medicine is gone have sent in 90-day supply.  In the event that you cannot get in before this 90-day supply please call my office.Thanks much Dr. Morgan.

## 2023-06-07 ENCOUNTER — NURSE TRIAGE (OUTPATIENT)
Dept: OTHER | Facility: CLINIC | Age: 63
End: 2023-06-07

## 2023-06-07 ENCOUNTER — OFFICE VISIT (OUTPATIENT)
Dept: FAMILY MEDICINE CLINIC | Age: 63
End: 2023-06-07
Payer: COMMERCIAL

## 2023-06-07 ENCOUNTER — HOSPITAL ENCOUNTER (OUTPATIENT)
Dept: GENERAL RADIOLOGY | Age: 63
Discharge: HOME | End: 2023-06-09
Payer: COMMERCIAL

## 2023-06-07 VITALS
HEART RATE: 83 BPM | DIASTOLIC BLOOD PRESSURE: 78 MMHG | RESPIRATION RATE: 14 BRPM | OXYGEN SATURATION: 96 % | TEMPERATURE: 97 F | SYSTOLIC BLOOD PRESSURE: 132 MMHG | WEIGHT: 275 LBS | BODY MASS INDEX: 43.16 KG/M2 | HEIGHT: 67 IN

## 2023-06-07 DIAGNOSIS — R07.81 RIB PAIN ON RIGHT SIDE: ICD-10-CM

## 2023-06-07 DIAGNOSIS — R06.89 TROUBLE BREATHING: ICD-10-CM

## 2023-06-07 DIAGNOSIS — M25.511 ACUTE PAIN OF RIGHT SHOULDER: ICD-10-CM

## 2023-06-07 DIAGNOSIS — M54.2 CERVICAL PAIN (NECK): Primary | ICD-10-CM

## 2023-06-07 DIAGNOSIS — M54.9 MID BACK PAIN ON RIGHT SIDE: ICD-10-CM

## 2023-06-07 DIAGNOSIS — M54.2 CERVICAL PAIN (NECK): ICD-10-CM

## 2023-06-07 PROCEDURE — 72050 X-RAY EXAM NECK SPINE 4/5VWS: CPT

## 2023-06-07 PROCEDURE — 73030 X-RAY EXAM OF SHOULDER: CPT

## 2023-06-07 PROCEDURE — 72072 X-RAY EXAM THORAC SPINE 3VWS: CPT

## 2023-06-07 PROCEDURE — 99213 OFFICE O/P EST LOW 20 MIN: CPT | Performed by: PHYSICIAN ASSISTANT

## 2023-06-07 PROCEDURE — 71046 X-RAY EXAM CHEST 2 VIEWS: CPT

## 2023-06-07 RX ORDER — PYRIDOXINE HCL (VITAMIN B6) 50 MG
TABLET ORAL
COMMUNITY

## 2023-06-07 SDOH — ECONOMIC STABILITY: INCOME INSECURITY: HOW HARD IS IT FOR YOU TO PAY FOR THE VERY BASICS LIKE FOOD, HOUSING, MEDICAL CARE, AND HEATING?: NOT HARD AT ALL

## 2023-06-07 SDOH — ECONOMIC STABILITY: FOOD INSECURITY: WITHIN THE PAST 12 MONTHS, YOU WORRIED THAT YOUR FOOD WOULD RUN OUT BEFORE YOU GOT MONEY TO BUY MORE.: NEVER TRUE

## 2023-06-07 SDOH — ECONOMIC STABILITY: HOUSING INSECURITY
IN THE LAST 12 MONTHS, WAS THERE A TIME WHEN YOU DID NOT HAVE A STEADY PLACE TO SLEEP OR SLEPT IN A SHELTER (INCLUDING NOW)?: NO

## 2023-06-07 SDOH — ECONOMIC STABILITY: FOOD INSECURITY: WITHIN THE PAST 12 MONTHS, THE FOOD YOU BOUGHT JUST DIDN'T LAST AND YOU DIDN'T HAVE MONEY TO GET MORE.: NEVER TRUE

## 2023-06-07 ASSESSMENT — ENCOUNTER SYMPTOMS
SINUS PRESSURE: 0
VOMITING: 0
CHEST TIGHTNESS: 1
NAUSEA: 0
BACK PAIN: 1
ABDOMINAL PAIN: 0
SORE THROAT: 0
SINUS PAIN: 0
SHORTNESS OF BREATH: 1
DIARRHEA: 0
VISUAL CHANGE: 0
CHOKING: 0
BOWEL INCONTINENCE: 0
COUGH: 0

## 2023-06-07 ASSESSMENT — VISUAL ACUITY: OU: 1

## 2023-06-07 ASSESSMENT — PATIENT HEALTH QUESTIONNAIRE - PHQ9
5. POOR APPETITE OR OVEREATING: 0
6. FEELING BAD ABOUT YOURSELF - OR THAT YOU ARE A FAILURE OR HAVE LET YOURSELF OR YOUR FAMILY DOWN: 0
3. TROUBLE FALLING OR STAYING ASLEEP: 0
SUM OF ALL RESPONSES TO PHQ9 QUESTIONS 1 & 2: 0
1. LITTLE INTEREST OR PLEASURE IN DOING THINGS: 0
2. FEELING DOWN, DEPRESSED OR HOPELESS: 0
SUM OF ALL RESPONSES TO PHQ QUESTIONS 1-9: 0
9. THOUGHTS THAT YOU WOULD BE BETTER OFF DEAD, OR OF HURTING YOURSELF: 0
SUM OF ALL RESPONSES TO PHQ QUESTIONS 1-9: 0
8. MOVING OR SPEAKING SO SLOWLY THAT OTHER PEOPLE COULD HAVE NOTICED. OR THE OPPOSITE, BEING SO FIGETY OR RESTLESS THAT YOU HAVE BEEN MOVING AROUND A LOT MORE THAN USUAL: 0
10. IF YOU CHECKED OFF ANY PROBLEMS, HOW DIFFICULT HAVE THESE PROBLEMS MADE IT FOR YOU TO DO YOUR WORK, TAKE CARE OF THINGS AT HOME, OR GET ALONG WITH OTHER PEOPLE: 0
7. TROUBLE CONCENTRATING ON THINGS, SUCH AS READING THE NEWSPAPER OR WATCHING TELEVISION: 0
SUM OF ALL RESPONSES TO PHQ QUESTIONS 1-9: 0
4. FEELING TIRED OR HAVING LITTLE ENERGY: 0
SUM OF ALL RESPONSES TO PHQ QUESTIONS 1-9: 0

## 2023-06-07 NOTE — TELEPHONE ENCOUNTER
Location of patient: [address] call from [place] [facility] [place]; Patient with Red Flag Complaint requesting to establish care with [facility]. Subjective: Caller states \"ribs hurt  \"     Current Symptoms: Imani Orta Thursday, June 1, was sitting on the chair, back of chair broke and fell backward to floor on back, didn't not hit head, whole back hurts, ribs hurt, pressing on sternum is tender and sore, big bruise on right arm, tightness when breathing, no shortness of breath but has pain when breathing. Onset: 6 days ago; waxing and waning    Associated Symptoms: NA    Pain Severity: 6/10; dull, aching, burning; constant    Temperature: Denies    What has been tried: Tylenol/Advil- a little    LMP: NA Pregnant: NA    Recommended disposition: See in Office Today or Tomorrow, advised caller if unable to get an appointment in the suggested time frame to go to an [facility] or [place] caller agreeable. Care advice provided, patient verbalizes understanding; denies any other questions or concerns; instructed to call back for any new or worsening symptoms. Patient/Caller agrees with recommended disposition; writer provided warm transfer to [facility] [employer] for appointment scheduling    Attention Provider: Thank you for allowing me to participate in the care of your patient. The patient was connected to triage in response to information provided to the [facility]. Please do not respond through this encounter as the response is not directed to a shared pool.       Reason for Disposition   High-risk adult (e.g., age > 61 years, osteoporosis, chronic steroid use)    Protocols used: Back Injury-ADULT-[place]

## 2023-06-07 NOTE — PROGRESS NOTES
for rash. Allergic/Immunologic: Negative for food allergies. Neurological:  Negative for tingling, loss of consciousness, weakness, light-headedness, numbness and headaches. Hematological:  Does not bruise/bleed easily. History reviewed. No pertinent past medical history. History reviewed. No pertinent surgical history. Social History     Socioeconomic History    Marital status:      Spouse name: Not on file    Number of children: Not on file    Years of education: Not on file    Highest education level: Not on file   Occupational History    Not on file   Tobacco Use    Smoking status: Never    Smokeless tobacco: Never   Substance and Sexual Activity    Alcohol use: Never    Drug use: Never    Sexual activity: Not on file   Other Topics Concern    Not on file   Social History Narrative    Not on file     Social Determinants of Health     Financial Resource Strain: Low Risk     Difficulty of Paying Living Expenses: Not hard at all   Food Insecurity: No Food Insecurity    Worried About Running Out of Food in the Last Year: Never true    [address] in the Last Year: Never true   Transportation Needs: Unknown    Lack of Transportation (Medical): Not on file    Lack of Transportation (Non-Medical): No   Physical Activity: Not on file   Stress: Not on file   Social Connections: Not on file   Intimate Partner Violence: Not on file   Housing Stability: Unknown    Unable to Pay for Housing in the Last Year: Not on file    Number of Places Lived in the Last Year: Not on file    Unstable Housing in the Last Year: No     History reviewed. No pertinent family history.   Allergies   Allergen Reactions    Penicillins Anaphylaxis, Rash and Swelling    Adhesive Tape      Current Outpatient Medications   Medication Sig Dispense Refill    vibegron (GEMTESA) 75 MG TABS tablet Take 1 tablet by mouth daily 30 each 14    Biotin 10 MG CHEW Take by mouth      Cyanocobalamin (B-12) 100 MCG TABS Take by mouth

## 2023-07-11 ENCOUNTER — OFFICE VISIT (OUTPATIENT)
Dept: PRIMARY CARE | Facility: CLINIC | Age: 63
End: 2023-07-11
Payer: COMMERCIAL

## 2023-07-11 VITALS
WEIGHT: 270 LBS | HEIGHT: 67 IN | OXYGEN SATURATION: 97 % | TEMPERATURE: 97 F | HEART RATE: 96 BPM | DIASTOLIC BLOOD PRESSURE: 84 MMHG | SYSTOLIC BLOOD PRESSURE: 134 MMHG | RESPIRATION RATE: 18 BRPM | BODY MASS INDEX: 42.38 KG/M2

## 2023-07-11 DIAGNOSIS — E55.9 VITAMIN D DEFICIENCY: ICD-10-CM

## 2023-07-11 DIAGNOSIS — E04.1 THYROID NODULE: ICD-10-CM

## 2023-07-11 DIAGNOSIS — E78.5 HYPERLIPIDEMIA, UNSPECIFIED HYPERLIPIDEMIA TYPE: ICD-10-CM

## 2023-07-11 DIAGNOSIS — E11.69 TYPE 2 DIABETES MELLITUS WITH OTHER SPECIFIED COMPLICATION, WITHOUT LONG-TERM CURRENT USE OF INSULIN (MULTI): Primary | ICD-10-CM

## 2023-07-11 DIAGNOSIS — I10 PRIMARY HYPERTENSION: ICD-10-CM

## 2023-07-11 DIAGNOSIS — H91.90 HEARING LOSS, UNSPECIFIED HEARING LOSS TYPE, UNSPECIFIED LATERALITY: ICD-10-CM

## 2023-07-11 PROBLEM — K44.9 HIATAL HERNIA: Status: ACTIVE | Noted: 2023-07-11

## 2023-07-11 PROBLEM — M77.30 HEEL SPUR, UNSPECIFIED LATERALITY: Status: ACTIVE | Noted: 2019-07-02

## 2023-07-11 PROBLEM — N39.0 FREQUENT UTI: Status: ACTIVE | Noted: 2023-07-11

## 2023-07-11 PROBLEM — M17.9 OSTEOARTHRITIS OF KNEE: Status: ACTIVE | Noted: 2023-07-11

## 2023-07-11 PROBLEM — H53.8 BLURRY VISION: Status: ACTIVE | Noted: 2023-07-11

## 2023-07-11 PROBLEM — R39.9 UTI SYMPTOMS: Status: ACTIVE | Noted: 2023-07-11

## 2023-07-11 PROBLEM — G47.33 OSA ON CPAP: Status: ACTIVE | Noted: 2023-07-11

## 2023-07-11 PROBLEM — M72.2 PLANTAR FASCIITIS: Status: ACTIVE | Noted: 2019-07-02

## 2023-07-11 PROBLEM — I65.29 CAROTID STENOSIS: Status: ACTIVE | Noted: 2023-07-11

## 2023-07-11 PROBLEM — M79.671 PAIN IN BOTH FEET: Status: ACTIVE | Noted: 2019-07-02

## 2023-07-11 PROBLEM — D64.9 ABSOLUTE ANEMIA: Status: ACTIVE | Noted: 2023-07-11

## 2023-07-11 PROBLEM — N60.19 FIBROCYSTIC BREAST CHANGES: Status: ACTIVE | Noted: 2023-07-11

## 2023-07-11 PROBLEM — E86.0 DEHYDRATION: Status: ACTIVE | Noted: 2023-07-11

## 2023-07-11 PROBLEM — N76.0 VAGINITIS: Status: ACTIVE | Noted: 2023-07-11

## 2023-07-11 PROBLEM — J02.9 PHARYNGITIS: Status: ACTIVE | Noted: 2023-07-11

## 2023-07-11 PROBLEM — U07.1 DISEASE DUE TO SEVERE ACUTE RESPIRATORY SYNDROME CORONAVIRUS 2 (SARS-COV-2): Status: ACTIVE | Noted: 2023-07-11

## 2023-07-11 PROBLEM — M79.672 PAIN IN LEFT FOOT: Status: ACTIVE | Noted: 2019-08-13

## 2023-07-11 PROBLEM — R61 NIGHT SWEATS: Status: ACTIVE | Noted: 2023-07-11

## 2023-07-11 PROBLEM — E11.9 TYPE 2 DIABETES MELLITUS (MULTI): Status: ACTIVE | Noted: 2023-07-11

## 2023-07-11 PROBLEM — J30.9 ALLERGIC RHINITIS: Status: ACTIVE | Noted: 2023-07-11

## 2023-07-11 PROBLEM — K76.0 HEPATIC STEATOSIS: Status: ACTIVE | Noted: 2023-07-11

## 2023-07-11 PROBLEM — K21.00 REFLUX ESOPHAGITIS: Status: ACTIVE | Noted: 2023-07-11

## 2023-07-11 PROBLEM — M79.672 PAIN IN BOTH FEET: Status: ACTIVE | Noted: 2019-07-02

## 2023-07-11 PROBLEM — F41.9 ANXIETY: Status: ACTIVE | Noted: 2023-07-11

## 2023-07-11 PROBLEM — E66.9 OBESITY: Status: ACTIVE | Noted: 2023-07-11

## 2023-07-11 PROBLEM — N89.8 VAGINAL ITCHING: Status: ACTIVE | Noted: 2023-07-11

## 2023-07-11 PROBLEM — K80.20 CHOLELITHIASIS: Status: ACTIVE | Noted: 2023-07-11

## 2023-07-11 PROBLEM — J45.909 ASTHMA (HHS-HCC): Status: ACTIVE | Noted: 2023-07-11

## 2023-07-11 PROBLEM — R19.00 ABDOMINAL MASS: Status: ACTIVE | Noted: 2023-07-11

## 2023-07-11 PROBLEM — L08.1 ERYTHRASMA: Status: ACTIVE | Noted: 2023-07-11

## 2023-07-11 PROBLEM — R06.09 DYSPNEA ON EXERTION: Status: ACTIVE | Noted: 2023-07-11

## 2023-07-11 PROBLEM — R73.03 PREDIABETES: Status: ACTIVE | Noted: 2023-07-11

## 2023-07-11 LAB — POC HEMOGLOBIN A1C: 6.6 % (ref 4.2–6.5)

## 2023-07-11 PROCEDURE — 3079F DIAST BP 80-89 MM HG: CPT | Performed by: FAMILY MEDICINE

## 2023-07-11 PROCEDURE — 83036 HEMOGLOBIN GLYCOSYLATED A1C: CPT | Performed by: FAMILY MEDICINE

## 2023-07-11 PROCEDURE — 99214 OFFICE O/P EST MOD 30 MIN: CPT | Performed by: FAMILY MEDICINE

## 2023-07-11 PROCEDURE — 3075F SYST BP GE 130 - 139MM HG: CPT | Performed by: FAMILY MEDICINE

## 2023-07-11 PROCEDURE — 1036F TOBACCO NON-USER: CPT | Performed by: FAMILY MEDICINE

## 2023-07-11 PROCEDURE — 4010F ACE/ARB THERAPY RXD/TAKEN: CPT | Performed by: FAMILY MEDICINE

## 2023-07-11 RX ORDER — METFORMIN HYDROCHLORIDE 500 MG/1
TABLET, EXTENDED RELEASE ORAL
COMMUNITY
Start: 2021-09-10 | End: 2023-11-14 | Stop reason: SDUPTHER

## 2023-07-11 RX ORDER — OMEPRAZOLE 20 MG/1
1 CAPSULE, DELAYED RELEASE ORAL DAILY
COMMUNITY
Start: 2019-10-21

## 2023-07-11 RX ORDER — GLIPIZIDE 5 MG/1
TABLET, FILM COATED, EXTENDED RELEASE ORAL
COMMUNITY
Start: 2021-09-10 | End: 2023-12-14

## 2023-07-11 RX ORDER — PRAVASTATIN SODIUM 80 MG/1
TABLET ORAL
COMMUNITY
End: 2023-07-11 | Stop reason: SDUPTHER

## 2023-07-11 RX ORDER — PNV NO.95/FERROUS FUM/FOLIC AC 28MG-0.8MG
TABLET ORAL
COMMUNITY

## 2023-07-11 RX ORDER — LORATADINE 10 MG/1
1 TABLET ORAL DAILY
COMMUNITY
Start: 2021-04-21

## 2023-07-11 RX ORDER — PRAVASTATIN SODIUM 80 MG/1
80 TABLET ORAL DAILY
Qty: 90 TABLET | Refills: 3 | Status: SHIPPED | OUTPATIENT
Start: 2023-07-11 | End: 2024-07-10

## 2023-07-11 RX ORDER — ESOMEPRAZOLE MAGNESIUM 20 MG/1
20 GRANULE, DELAYED RELEASE ORAL DAILY
COMMUNITY
End: 2024-06-06 | Stop reason: ALTCHOICE

## 2023-07-11 RX ORDER — VIBEGRON 75 MG/1
1 TABLET, FILM COATED ORAL DAILY
COMMUNITY

## 2023-07-11 RX ORDER — LANOLIN ALCOHOL/MO/W.PET/CERES
CREAM (GRAM) TOPICAL
COMMUNITY

## 2023-07-11 RX ORDER — MECOBALAMIN 1000 MCG
TABLET,CHEWABLE ORAL
COMMUNITY

## 2023-07-11 NOTE — PROGRESS NOTES
"Subjective   Patient ID: Nichelle Stahl is a 62 y.o. female who presents for ear check and clean.    HPI   Saw  audiologist  and    ears are full of wax    Patient is also here for follow-up of hypertension.. Symptoms do not include headache confusion visual disturbance dizziness shortness of breath chest pain syncope or palpitations. Recent blood pressure patient has    not  been checking. By report there is good compliance with treatment. Pertinent medical history includes diabetes/hyperlipidemia     Patient is here for a recheck of diabetes.   Today  hgba1c  6.6  occasionally/often/rarely checks his blood sugars... Highest sugar was ???       low sugar..140   remains compliant on his medications.,,, Hypoglycemia has occurred rarely or never.... Symptoms do not include polydipsia, polyuria, blurred vision, numbness and tingling in the toes, increased appetite,   slight weight gain, weight loss, infections or foot problems... Last eye exam 2022     Review of Systems  cardiovascular:  no  palpitations or chest  pain  respiratory: no  shortness  of  breath  Heent  ... Hearing  loss  right  and  left  endocrine:  no polydipsia,  no polyuria  musculoskeletal:  no  myalgia..   yes arthralgia    .  Broken  humerus   All other  systems discussed  negative   Objective   /84   Pulse 96   Temp 36.1 °C (97 °F)   Resp 18   Ht 1.702 m (5' 7\")   Wt 122 kg (270 lb)   SpO2 97%   BMI 42.29 kg/m²     Physical Exam  general: alert oriented x three  HEENT hearing diminshed to  voice   .. Cerumen   in   right ear and  occluded  Neck supple  Lungs respirations non-labored.  Cardiovascular: no peripheral edema  Skin: warm and dry without rash  Psych: judgement and insight normal  Musculoskeletal:  ambulation normal,    Right shoulder  with  sling  in   place   lymph:negative cervical  LYMPatient ID: Nichelle Stahl is a 62 y.o. female.    Ear Cerumen Removal    Date/Time: 7/12/2023 7:17 PM    Performed by: Dayne Morgan, " DO  Authorized by: Dayne Morgan DO    Consent:     Consent obtained:  Verbal    Consent given by:  Patient    Risks, benefits, and alternatives were discussed: yes      Risks discussed:  Incomplete removal  Universal protocol:     Procedure explained and questions answered to patient or proxy's satisfaction: yes      Relevant documents present and verified: yes      Patient identity confirmed:  Verbally with patient  Procedure details:     Location:  R ear    Procedure type: curette      Procedure outcomes: cerumen removed    Post-procedure details:     Inspection:  TM intact    Hearing quality:  Improved    Procedure completion:  Tolerated  PADENOPATHY  thyroid: non palpable enlargement   Review of thyroid blood work dated 2/23 showed T SH 1.18 and free T40.80.    Lipids dated 2/1/2023 cholesterol 231 HDL 58  triglycerides 348 and      Assessment/Plan   Problem List Items Addressed This Visit       Hypertension     Reviewed blood patient's blood pressure patient's 134/84.  Continues to be stable.  Advised to continue taking lisinopril at 20 mg.  Low-salt diet and exercise will also help control blood pressure.  Labs reviewedNumbers look like need to repeat a BMP and this was ordered in light of patient being on ACE inhibitor.  Last CMP was 6/21 which showed normal kidney function normal electrolytes with elevated sugar.         Vitamin D deficiency    Thyroid nodule     Ultrasound of thyroid dated 7/9/2021 showed no nodules on the right lobe the left lobe demonstrated a solid 10 x 7 x 8 mm left nodule.  Therefore we will keep ultrasound to evaluate left lobe nodule.         Relevant Orders    US thyroid    Hyperlipidemia      numbersDiscussed with patient lipid.  The LDL was good however triglycerides were elevated at 348.  She is only taking the statin intermittently if at all.  Encouraged to take statin and will give order for both CMP and lipids which she can get before the end of the year with  thyroid sonogram.         Relevant Medications    pravastatin (Pravachol) 80 mg tablet    Other Relevant Orders    Lipid Panel    Hearing loss     Suspect due to excessive cerumen and will attempt to remove         Type 2 diabetes mellitus (CMS/HCC) - Primary     Today's hemoglobin A1c 6.6 Numbers are actually quite good.  Patient was encouraged and advised to continue weight watchers along with exercise routine.  Would recommend continuing glipizide along with metformin for now and to clarify regarding thyroid cancer with hands.  For now we will hold off on Ozempic.            Relevant Orders    POCT glycosylated hemoglobin (Hb A1C) manually resulted (Completed)    CBC and Auto Differential    Comprehensive Metabolic Panel    Albumin , Urine Random

## 2023-07-11 NOTE — ASSESSMENT & PLAN NOTE
Ultrasound of thyroid dated 7/9/2021 showed no nodules on the right lobe the left lobe demonstrated a solid 10 x 7 x 8 mm left nodule.  Therefore we will keep ultrasound to evaluate left lobe nodule.

## 2023-07-11 NOTE — ASSESSMENT & PLAN NOTE
Today's hemoglobin A1c 6.6 Numbers are actually quite good.  Patient was encouraged and advised to continue weight watchers along with exercise routine.  Would recommend continuing glipizide along with metformin for now and to clarify regarding thyroid cancer with hands.  For now we will hold off on Ozempic.

## 2023-07-11 NOTE — ASSESSMENT & PLAN NOTE
Reviewed blood patient's blood pressure patient's 134/84.  Continues to be stable.  Advised to continue taking lisinopril at 20 mg.  Low-salt diet and exercise will also help control blood pressure.  Labs reviewedNumbers look like need to repeat a BMP and this was ordered in light of patient being on ACE inhibitor.  Last CMP was 6/21 which showed normal kidney function normal electrolytes with elevated sugar.

## 2023-07-11 NOTE — ASSESSMENT & PLAN NOTE
numbersDiscussed with patient lipid.  The LDL was good however triglycerides were elevated at 348.  She is only taking the statin intermittently if at all.  Encouraged to take statin and will give order for both CMP and lipids which she can get before the end of the year with thyroid sonogram.

## 2023-07-11 NOTE — PATIENT INSTRUCTIONS

## 2023-07-12 PROCEDURE — 69210 REMOVE IMPACTED EAR WAX UNI: CPT | Performed by: FAMILY MEDICINE

## 2023-11-08 DIAGNOSIS — I10 HYPERTENSION, UNSPECIFIED TYPE: ICD-10-CM

## 2023-11-09 RX ORDER — LISINOPRIL 20 MG/1
TABLET ORAL
Qty: 90 TABLET | Refills: 1 | Status: SHIPPED | OUTPATIENT
Start: 2023-11-09 | End: 2023-11-14 | Stop reason: SDUPTHER

## 2023-11-14 DIAGNOSIS — I10 HYPERTENSION, UNSPECIFIED TYPE: ICD-10-CM

## 2023-11-14 DIAGNOSIS — E11.69 TYPE 2 DIABETES MELLITUS WITH OTHER SPECIFIED COMPLICATION, WITHOUT LONG-TERM CURRENT USE OF INSULIN (MULTI): ICD-10-CM

## 2023-11-14 RX ORDER — LISINOPRIL 20 MG/1
20 TABLET ORAL DAILY
Qty: 90 TABLET | Refills: 1 | Status: SHIPPED | OUTPATIENT
Start: 2023-11-14

## 2023-11-14 RX ORDER — METFORMIN HYDROCHLORIDE 500 MG/1
TABLET, EXTENDED RELEASE ORAL
Qty: 180 TABLET | Refills: 1 | Status: SHIPPED | OUTPATIENT
Start: 2023-11-14

## 2023-11-15 DIAGNOSIS — E11.69 TYPE 2 DIABETES MELLITUS WITH OTHER SPECIFIED COMPLICATION, WITHOUT LONG-TERM CURRENT USE OF INSULIN (MULTI): ICD-10-CM

## 2023-11-15 DIAGNOSIS — I10 HYPERTENSION, UNSPECIFIED TYPE: ICD-10-CM

## 2023-12-13 DIAGNOSIS — E11.69 TYPE 2 DIABETES MELLITUS WITH OTHER SPECIFIED COMPLICATION, WITHOUT LONG-TERM CURRENT USE OF INSULIN (MULTI): Primary | ICD-10-CM

## 2023-12-14 RX ORDER — GLIPIZIDE 5 MG/1
TABLET, FILM COATED, EXTENDED RELEASE ORAL
Qty: 90 TABLET | Refills: 2 | Status: SHIPPED | OUTPATIENT
Start: 2023-12-14 | End: 2024-06-06 | Stop reason: SDUPTHER

## 2024-01-11 ENCOUNTER — OFFICE VISIT (OUTPATIENT)
Dept: PRIMARY CARE | Facility: CLINIC | Age: 64
End: 2024-01-11
Payer: COMMERCIAL

## 2024-01-11 VITALS
RESPIRATION RATE: 18 BRPM | HEIGHT: 67 IN | DIASTOLIC BLOOD PRESSURE: 83 MMHG | HEART RATE: 81 BPM | SYSTOLIC BLOOD PRESSURE: 135 MMHG | WEIGHT: 259 LBS | OXYGEN SATURATION: 95 % | BODY MASS INDEX: 40.65 KG/M2 | TEMPERATURE: 97 F

## 2024-01-11 DIAGNOSIS — M25.511 PAIN IN JOINT OF RIGHT SHOULDER: ICD-10-CM

## 2024-01-11 DIAGNOSIS — E78.5 HYPERLIPIDEMIA, UNSPECIFIED HYPERLIPIDEMIA TYPE: ICD-10-CM

## 2024-01-11 DIAGNOSIS — S39.012D STRAIN OF LUMBAR REGION, SUBSEQUENT ENCOUNTER: ICD-10-CM

## 2024-01-11 DIAGNOSIS — G89.29 CHRONIC RIGHT SHOULDER PAIN: ICD-10-CM

## 2024-01-11 DIAGNOSIS — E11.69 TYPE 2 DIABETES MELLITUS WITH OTHER SPECIFIED COMPLICATION, WITHOUT LONG-TERM CURRENT USE OF INSULIN (MULTI): Primary | ICD-10-CM

## 2024-01-11 DIAGNOSIS — I10 HYPERTENSION, UNSPECIFIED TYPE: ICD-10-CM

## 2024-01-11 DIAGNOSIS — M25.511 CHRONIC RIGHT SHOULDER PAIN: ICD-10-CM

## 2024-01-11 PROBLEM — L60.3 ONYCHODYSTROPHY: Status: ACTIVE | Noted: 2023-08-04

## 2024-01-11 PROBLEM — M19.91 LOCALIZED, PRIMARY OSTEOARTHRITIS: Status: ACTIVE | Noted: 2021-12-22

## 2024-01-11 PROBLEM — M25.561 PAIN IN RIGHT KNEE: Status: ACTIVE | Noted: 2021-04-07

## 2024-01-11 PROBLEM — B35.1 TINEA UNGUIUM: Status: ACTIVE | Noted: 2023-08-04

## 2024-01-11 PROBLEM — M65.90 SYNOVITIS AND TENOSYNOVITIS: Status: ACTIVE | Noted: 2021-04-07

## 2024-01-11 PROBLEM — S42.251A DISPLACED FRACTURE OF GREATER TUBEROSITY OF RIGHT HUMERUS, INITIAL ENCOUNTER FOR CLOSED FRACTURE: Status: ACTIVE | Noted: 2023-07-05

## 2024-01-11 PROBLEM — L85.3 XEROSIS OF SKIN: Status: ACTIVE | Noted: 2023-08-04

## 2024-01-11 PROBLEM — M25.519 SHOULDER JOINT PAIN: Status: ACTIVE | Noted: 2023-07-05

## 2024-01-11 PROBLEM — S83.92XA SPRAIN OF LEFT KNEE: Status: ACTIVE | Noted: 2021-04-07

## 2024-01-11 PROBLEM — M71.9 DISORDER OF BURSAE OF SHOULDER REGION: Status: ACTIVE | Noted: 2023-10-27

## 2024-01-11 PROBLEM — M65.9 SYNOVITIS AND TENOSYNOVITIS: Status: ACTIVE | Noted: 2021-04-07

## 2024-01-11 LAB — POC HEMOGLOBIN A1C: 5.8 % (ref 4.2–6.5)

## 2024-01-11 PROCEDURE — 3079F DIAST BP 80-89 MM HG: CPT | Performed by: FAMILY MEDICINE

## 2024-01-11 PROCEDURE — 1036F TOBACCO NON-USER: CPT | Performed by: FAMILY MEDICINE

## 2024-01-11 PROCEDURE — 3075F SYST BP GE 130 - 139MM HG: CPT | Performed by: FAMILY MEDICINE

## 2024-01-11 PROCEDURE — 4010F ACE/ARB THERAPY RXD/TAKEN: CPT | Performed by: FAMILY MEDICINE

## 2024-01-11 PROCEDURE — 99214 OFFICE O/P EST MOD 30 MIN: CPT | Performed by: FAMILY MEDICINE

## 2024-01-11 PROCEDURE — 83036 HEMOGLOBIN GLYCOSYLATED A1C: CPT | Performed by: FAMILY MEDICINE

## 2024-01-11 RX ORDER — TIZANIDINE HYDROCHLORIDE 4 MG/1
4 CAPSULE, GELATIN COATED ORAL 2 TIMES DAILY
Qty: 60 CAPSULE | Refills: 2 | Status: SHIPPED | OUTPATIENT
Start: 2024-01-11 | End: 2024-02-10

## 2024-01-11 ASSESSMENT — ENCOUNTER SYMPTOMS: HYPERTENSION: 1

## 2024-01-11 NOTE — ASSESSMENT & PLAN NOTE
Hemoglobin A1c   5.8  excellent control we will switch to Ozempic get advice from pharmacy on discontinuing metformin and continue dietary suggestions previously discussed

## 2024-01-11 NOTE — PROGRESS NOTES
Subjective   Patient ID: Nichelle Stahl is a 63 y.o. female who presents for Follow-up.  Patient is also here for follow-up of hypertension.. Symptoms do not include headache confusion visual disturbance dizziness shortness of breath chest pain syncope or palpitations. Recent blood pressure patient hast been checking. By report there is good compliance with treatment. Pertinent medical history includes diabetes     Hypertension  The problem has been gradually improving since onset. The problem is controlled.   Diabetes  She presents for her follow-up diabetic visit. She has type 2 diabetes mellitus. Symptoms are stable. She is compliant with treatment all of the time.    Patient is here for a recheck of diabetes.hgba1c  was   5.8      Symptoms do not include polydipsia, polyuria, blurred vision, numbness and tingling in the toes, increased appetite, weight gain, weight loss, infections or foot problems.. baby aspirin   81mg........    eye exam          feet problems.... thickened toe nail....        Patient here for follow-up.  She has had an eventful 6 months.  She has had multiple falls and first fall was from a chair went to the emergency room and was seen with x-rays ordered of the thoracic spine cervical spine shoulder and chest dated 6/7/2023.  According to medical records from [facility] accident had occurred 5 to 7 days prior to falling backwards and examination remarkable for decreased range of motion of cervical spine along with thoracic back tenderness.   Following this patient had a fall of approximately 2 feet from porch landing on face and right shoulder with subsequent fracture.  She was seen by orthopedics and evaluation including x-rays showed fracture.  She was placed in a sling and sent home however since that time she has had progressive loss of range of motion with difficulty using right upper extremity despite chiropractic and physical therapy.  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  Review of Systems  cardiovascular:  no  palpitations or chest  pain  respiratory: no  shortness  of  breath  endocrine:  no polydipsia,  no polyuria  musculoskeletal:  no  myalgia.. yes  arthralgia  All other  systems discussed  negative   Objective   /83   Pulse 81   Temp 36.1 °C (97 °F)   Resp 18   Ht 1.702 m (5' 7\")   Wt 117 kg (259 lb)   SpO2 95%   BMI 40.57 kg/m²     Physical Exam  general: alert oriented x three  HEENT hearing normal to voice  Neck supple  Lungs respirations non-labored.  Cardiovascular: no peripheral edema  Skin: warm and dry without rash  Psych: judgement and insight normal  Musculoskeletal:  ambulation normal,    Back tendernessof lumbar spine   lymph:negative cervical  LYMPADENOPATHY  thyroid: non palpable enlargement   Patient here for follow-up.  She has had an eventful 6 months.  She has had multiple falls and first fall was from a chair went to the emergency room and was seen with x-rays ordered of the thoracic spine cervical spine shoulder and chest dated 6/7/2023.  According to medical records from [facility] accident had occurred 5 to 7 days prior to falling backwards and examination remarkable for decreased range of motion of cervical spine along with thoracic back tenderness.  Multiple x-rays were ordered and she was placed on anti-inflammatories and muscle relaxants.  Results of x-rays revealed glenohumeral joint is normally aligned no evidence of fracture however there was AC joint arthritis.  In addition C-spine x-ray same date showed loss of cervical lordosis with diffuse disc space narrowing C3 445 soft tissue swelling with the thoracic x-ray showing multilevel degenerative changes pedicles are symmetric and intact.  Lipid Panel  Order: [id number]  Status: Final result       Visible to patient: " Yes (not seen)    0 Result Notes       1 HM Topic          Component  Ref Range & Units 11 mo ago  (2/1/23) 2 yr ago  (6/17/21) 3 yr ago  (11/2/20) 4 yr ago  (11/4/19) 4 yr ago  (5/22/19)   Cholesterol  0 - 199 mg/dL 231 High  195   High   High  CM   Comment: .      AGE      DESIRABLE   BORDERLINE HIGH   HIGH     0-19 Y     0 - 169       170 - 199     >/= 200    20-24 Y     0 - 189       190 - 224     >/= 225        >24 Y     0 - 199       200 - 239     >/= 240   **All ranges are based on fasting samples. Specific   therapeutic targets will vary based on patient-specific   cardiac risk.  .   Pediatric guidelines reference:Pediatrics 2011, 128(S5).   Adult guidelines reference: NCEP ATPIII Guidelines,    JOSE ROBERTO 2001, 258:2486-97  .   Venipuncture immediately after or during the   administration of Metamizole may lead to falsely   low results. Testing should be performed immediately   prior to Metamizole dosing.   HDL  mg/dL 58.7 54.0 CM 49.0 CM 46.0 CM 46.9 CM   Comment: .      AGE      VERY LOW   LOW     NORMAL    HIGH       0-19 Y       < 35   < 40     40-45     ----    20-24 Y       ----   < 40       >45     ----      >24 Y       ----   < 40     40-60      >60  .   Cholesterol/HDL Ratio 3.9 3.6 CM 3.7 CM 5.2 Abnormal  CM 5.1 Abnormal  CM   Comment: REF VALUES  DESIRABLE  < 3.4  HIGH RISK  > 5.0   LDL  0 - 99 mg/dL 103 High  78  High   High   High  CM   Comment: .                           NEAR      BORD      AGE      DESIRABLE  OPTIMAL    HIGH     HIGH     VERY HIGH     0-19 Y     0 - 109     ---    110-129   >/= 130     ----    20-24 Y     0 - 119     ---    120-159   >/= 160     ----      >24 Y     0 -  99   100-129  130-159   160-189     >/=190  .   VLDL  0 - 40 mg/dL 70 High  63 High  27 57 High  48 High    Triglycerides  0 - 149 mg/dL 348 High  315 High    High   High  CM   Comment: .        Assessment/Plan   Problem List Items Addressed This Visit        Hypertension     stable and continue meds          Relevant Orders    Comprehensive Metabolic Panel    CBC and Auto Differential    Hyperlipidemia     Continue show order written pravastatin stable continue meds transfer text         Relevant Orders    Lipid Panel    Type 2 diabetes mellitus (CMS/[facility]) - Primary     Hemoglobin A1c   5.8  excellent control we will switch to Ozempic get advice from pharmacy on discontinuing metformin and continue dietary suggestions previously discussed         Relevant Medications    semaglutide 0.25 mg or 0.5 mg (2 mg/3 mL) pen injector    Other Relevant Orders    POCT glycosylated hemoglobin (Hb A1C) manually resulted (Completed)    Follow Up In [facility] - Pharmacy    Albumin , Urine Random    Shoulder joint pain       patient had a fall of approximately 2 feet from porch landing on face and right shoulder with subsequent fracture.  She was seen by orthopedics and evaluation including x-rays showed fracture.  She was placed in a sling and sent home however since that time she has had progressive loss of range of motion with difficulty using right upper extremity despite chiropractic and physical therapy.  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  To orthopedics for opinion patient had this abduction of right middle finger get assistance to determine cause          Other Visit Diagnoses       Strain of lumbar region, subsequent encounter        Relevant Medications    tiZANidine (Zanaflex) 4 mg capsule    Other Relevant Orders    XR lumbar spine 2-3 views    Chronic right shoulder pain        Relevant Orders    Referral to Orthopaedic Surgery

## 2024-01-12 NOTE — ASSESSMENT & PLAN NOTE
patient had a fall of approximately 2 feet from porch landing on face and right shoulder with subsequent fracture.  She was seen by orthopedics and evaluation including x-rays showed fracture.  She was placed in a sling and sent home however since that time she has had progressive loss of range of motion with difficulty using right upper extremity despite chiropractic and physical therapy.  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  To orthopedics for opinion patient had this abduction of right middle finger get assistance to determine cause

## 2024-01-15 ENCOUNTER — APPOINTMENT (OUTPATIENT)
Dept: LAB | Facility: LAB | Age: 64
End: 2024-01-15
Payer: COMMERCIAL

## 2024-01-15 ENCOUNTER — ANCILLARY PROCEDURE (OUTPATIENT)
Dept: RADIOLOGY | Facility: CLINIC | Age: 64
End: 2024-01-15
Payer: COMMERCIAL

## 2024-01-15 DIAGNOSIS — S39.012D STRAIN OF LUMBAR REGION, SUBSEQUENT ENCOUNTER: ICD-10-CM

## 2024-01-15 PROCEDURE — 72100 X-RAY EXAM L-S SPINE 2/3 VWS: CPT | Performed by: RADIOLOGY

## 2024-01-15 PROCEDURE — 72100 X-RAY EXAM L-S SPINE 2/3 VWS: CPT

## 2024-01-16 ENCOUNTER — LAB (OUTPATIENT)
Dept: LAB | Facility: LAB | Age: 64
End: 2024-01-16
Payer: COMMERCIAL

## 2024-01-16 DIAGNOSIS — E11.69 TYPE 2 DIABETES MELLITUS WITH OTHER SPECIFIED COMPLICATION, WITHOUT LONG-TERM CURRENT USE OF INSULIN (MULTI): ICD-10-CM

## 2024-01-16 DIAGNOSIS — E78.5 HYPERLIPIDEMIA, UNSPECIFIED HYPERLIPIDEMIA TYPE: ICD-10-CM

## 2024-01-16 DIAGNOSIS — M48.061 SPINAL STENOSIS OF LUMBAR REGION, UNSPECIFIED WHETHER NEUROGENIC CLAUDICATION PRESENT: Primary | ICD-10-CM

## 2024-01-16 LAB
ALBUMIN SERPL BCP-MCNC: 4.7 G/DL (ref 3.4–5)
ALP SERPL-CCNC: 82 U/L (ref 33–136)
ALT SERPL W P-5'-P-CCNC: 20 U/L (ref 7–45)
ANION GAP SERPL CALC-SCNC: 15 MMOL/L (ref 10–20)
AST SERPL W P-5'-P-CCNC: 19 U/L (ref 9–39)
BASOPHILS # BLD AUTO: 0.07 X10*3/UL (ref 0–0.1)
BASOPHILS NFR BLD AUTO: 1.1 %
BILIRUB SERPL-MCNC: 0.6 MG/DL (ref 0–1.2)
BUN SERPL-MCNC: 20 MG/DL (ref 6–23)
CALCIUM SERPL-MCNC: 9.7 MG/DL (ref 8.6–10.3)
CHLORIDE SERPL-SCNC: 104 MMOL/L (ref 98–107)
CHOLEST SERPL-MCNC: 246 MG/DL (ref 0–199)
CHOLESTEROL/HDL RATIO: 4.2
CO2 SERPL-SCNC: 25 MMOL/L (ref 21–32)
CREAT SERPL-MCNC: 0.82 MG/DL (ref 0.5–1.05)
CREAT UR-MCNC: 237.3 MG/DL (ref 20–320)
EGFRCR SERPLBLD CKD-EPI 2021: 80 ML/MIN/1.73M*2
EOSINOPHIL # BLD AUTO: 0.11 X10*3/UL (ref 0–0.7)
EOSINOPHIL NFR BLD AUTO: 1.8 %
ERYTHROCYTE [DISTWIDTH] IN BLOOD BY AUTOMATED COUNT: 12.8 % (ref 11.5–14.5)
GLUCOSE SERPL-MCNC: 111 MG/DL (ref 74–99)
HCT VFR BLD AUTO: 45.5 % (ref 36–46)
HDLC SERPL-MCNC: 58.6 MG/DL
HGB BLD-MCNC: 15.3 G/DL (ref 12–16)
IMM GRANULOCYTES # BLD AUTO: 0.01 X10*3/UL (ref 0–0.7)
IMM GRANULOCYTES NFR BLD AUTO: 0.2 % (ref 0–0.9)
LDLC SERPL CALC-MCNC: 138 MG/DL
LYMPHOCYTES # BLD AUTO: 1.53 X10*3/UL (ref 1.2–4.8)
LYMPHOCYTES NFR BLD AUTO: 25.1 %
MCH RBC QN AUTO: 30.7 PG (ref 26–34)
MCHC RBC AUTO-ENTMCNC: 33.6 G/DL (ref 32–36)
MCV RBC AUTO: 91 FL (ref 80–100)
MICROALBUMIN UR-MCNC: 10.1 MG/L
MICROALBUMIN/CREAT UR: 4.3 UG/MG CREAT
MONOCYTES # BLD AUTO: 0.41 X10*3/UL (ref 0.1–1)
MONOCYTES NFR BLD AUTO: 6.7 %
NEUTROPHILS # BLD AUTO: 3.96 X10*3/UL (ref 1.2–7.7)
NEUTROPHILS NFR BLD AUTO: 65.1 %
NON HDL CHOLESTEROL: 187 MG/DL (ref 0–149)
NRBC BLD-RTO: 0 /100 WBCS (ref 0–0)
PLATELET # BLD AUTO: 236 X10*3/UL (ref 150–450)
POTASSIUM SERPL-SCNC: 3.9 MMOL/L (ref 3.5–5.3)
PROT SERPL-MCNC: 7.4 G/DL (ref 6.4–8.2)
RBC # BLD AUTO: 4.99 X10*6/UL (ref 4–5.2)
SODIUM SERPL-SCNC: 140 MMOL/L (ref 136–145)
TRIGL SERPL-MCNC: 248 MG/DL (ref 0–149)
VLDL: 50 MG/DL (ref 0–40)
WBC # BLD AUTO: 6.1 X10*3/UL (ref 4.4–11.3)

## 2024-01-16 PROCEDURE — 82043 UR ALBUMIN QUANTITATIVE: CPT

## 2024-01-16 PROCEDURE — 85025 COMPLETE CBC W/AUTO DIFF WBC: CPT

## 2024-01-16 PROCEDURE — 80053 COMPREHEN METABOLIC PANEL: CPT

## 2024-01-16 PROCEDURE — 82570 ASSAY OF URINE CREATININE: CPT

## 2024-01-16 PROCEDURE — 80061 LIPID PANEL: CPT

## 2024-01-16 PROCEDURE — 36415 COLL VENOUS BLD VENIPUNCTURE: CPT

## 2024-01-17 ENCOUNTER — TELEMEDICINE (OUTPATIENT)
Dept: PHARMACY | Facility: HOSPITAL | Age: 64
End: 2024-01-17
Payer: COMMERCIAL

## 2024-01-17 DIAGNOSIS — E11.69 TYPE 2 DIABETES MELLITUS WITH OTHER SPECIFIED COMPLICATION, WITHOUT LONG-TERM CURRENT USE OF INSULIN (MULTI): ICD-10-CM

## 2024-01-17 NOTE — PROGRESS NOTES
Subjective     Patient ID: Nichelle Stahl is a 63 y.o. female who presents for diabetes management.    Diabetes  She presents for her initial diabetic visit. She has type 2 diabetes mellitus. There are no hypoglycemic associated symptoms. There are no hypoglycemic complications. Risk factors for coronary artery disease include diabetes mellitus, obesity, hypertension and dyslipidemia. Current diabetic treatment includes oral agent (dual therapy). An ACE inhibitor/angiotensin II receptor blocker is being taken.     Allergies   Allergen Reactions    Penicillins Anaphylaxis, Rash and Swelling     lips swell    Adhesive Unknown    Adhesive Tape-Silicones Rash     Objective     Current DM Pharmacotherapy:   -metformin 500mg 2 po every day  -glipizide 5mg xl 1 po every day    SECONDARY PREVENTION  - Statin? Yes  - ACE-I/ARB? Yes  - Aspirin? No    Current monitoring regimen:   Patient is using: glucometer    SMBG Readings: none to report at todays visit    Any episodes of hypoglycemia? No  Hypoglycemia awareness? No    Pertinent PMH Review:  - PMH of Pancreatitis: No  - PMH/FH of Medullary Thyroid Cancer: No  - PMH of Retinopathy: No  - PMH of Urinary Tract Infections: No    Lab Review  Lab Results   Component Value Date    BILITOT 0.6 01/16/2024    CALCIUM 9.7 01/16/2024    CO2 25 01/16/2024     01/16/2024    CREATININE 0.82 01/16/2024    GLUCOSE 111 (H) 01/16/2024    ALKPHOS 82 01/16/2024    K 3.9 01/16/2024    PROT 7.4 01/16/2024     01/16/2024    AST 19 01/16/2024    ALT 20 01/16/2024    BUN 20 01/16/2024    ANIONGAP 15 01/16/2024    MG 1.80 06/17/2021    ALBUMIN 4.7 01/16/2024     Lab Results   Component Value Date    TRIG 248 (H) 01/16/2024    CHOL 246 (H) 01/16/2024    LDLCALC 138 (H) 01/16/2024    HDL 58.6 01/16/2024     Lab Results   Component Value Date    HGBA1C 5.8 01/11/2024     The 10-year ASCVD risk score (Kiesha ELIAS, et al., 2019) is: 13.8%    Values used to calculate the score:      Age: 63  years      Sex: Female      Is Non- : No      Diabetic: Yes      Tobacco smoker: No      Systolic Blood Pressure: 135 mmHg      Is BP treated: Yes      HDL Cholesterol: 58.6 mg/dL      Total Cholesterol: 246 mg/dL    Assessment/Plan   Patient last A1C is in goal and BG is improving. However, patient is looking to stop metformin. She has trailed metformin for 3 years and has extreme GI upset and diarrhea on medication. She is unable to tolerate medication any further and is looking for alternative therapy. Trulicity needs a PA to be covered. Will submit prior authorization and follow up in 1 week with outcome.    Type 2 diabetes mellitus, is at goal. <7%    Follow up: I recommend diabetes care be 1 week.    Anjelica Juarez, PharmD    Continue all meds under the continuation of care with the referring provider and clinical pharmacy team

## 2024-01-22 DIAGNOSIS — S39.012D STRAIN OF LUMBAR REGION, SUBSEQUENT ENCOUNTER: ICD-10-CM

## 2024-01-22 RX ORDER — TIZANIDINE HYDROCHLORIDE 4 MG/1
4 CAPSULE, GELATIN COATED ORAL 2 TIMES DAILY
Qty: 60 CAPSULE | Refills: 2 | Status: CANCELLED | OUTPATIENT
Start: 2024-01-22 | End: 2024-02-21

## 2024-01-24 ENCOUNTER — TELEMEDICINE (OUTPATIENT)
Dept: PHARMACY | Facility: HOSPITAL | Age: 64
End: 2024-01-24
Payer: COMMERCIAL

## 2024-01-24 DIAGNOSIS — E11.69 TYPE 2 DIABETES MELLITUS WITH OTHER SPECIFIED COMPLICATION, WITHOUT LONG-TERM CURRENT USE OF INSULIN (MULTI): ICD-10-CM

## 2024-01-24 RX ORDER — DULAGLUTIDE 0.75 MG/.5ML
0.75 INJECTION, SOLUTION SUBCUTANEOUS
Qty: 2 ML | Refills: 1 | Status: SHIPPED | OUTPATIENT
Start: 2024-01-24 | End: 2024-02-21 | Stop reason: DRUGHIGH

## 2024-01-24 NOTE — PROGRESS NOTES
Subjective     Patient ID: Nichelle Stahl is a 63 y.o. female who presents for diabetes management.    Diabetes  She presents for her follow-up diabetic visit. She has type 2 diabetes mellitus. There are no hypoglycemic associated symptoms. There are no hypoglycemic complications. Risk factors for coronary artery disease include diabetes mellitus, obesity, hypertension and dyslipidemia. Current diabetic treatment includes oral agent (dual therapy). An ACE inhibitor/angiotensin II receptor blocker is being taken.     Allergies   Allergen Reactions    Penicillins Anaphylaxis, Rash and Swelling     lips swell    Adhesive Unknown    Adhesive Tape-Silicones Rash     Objective     Current DM Pharmacotherapy:   -glipizide 5mg xl 1 po every day    SECONDARY PREVENTION  - Statin? Yes  - ACE-I/ARB? Yes  - Aspirin? No    Current monitoring regimen:   Patient is using: glucometer    SMBG Readings: none to report at todays visit    Any episodes of hypoglycemia? No  Hypoglycemia awareness? No    Pertinent PMH Review:  - PMH of Pancreatitis: No  - PMH/FH of Medullary Thyroid Cancer: No  - PMH of Retinopathy: No  - PMH of Urinary Tract Infections: No    Lab Review  Lab Results   Component Value Date    BILITOT 0.6 01/16/2024    CALCIUM 9.7 01/16/2024    CO2 25 01/16/2024     01/16/2024    CREATININE 0.82 01/16/2024    GLUCOSE 111 (H) 01/16/2024    ALKPHOS 82 01/16/2024    K 3.9 01/16/2024    PROT 7.4 01/16/2024     01/16/2024    AST 19 01/16/2024    ALT 20 01/16/2024    BUN 20 01/16/2024    ANIONGAP 15 01/16/2024    MG 1.80 06/17/2021    ALBUMIN 4.7 01/16/2024     Lab Results   Component Value Date    TRIG 248 (H) 01/16/2024    CHOL 246 (H) 01/16/2024    LDLCALC 138 (H) 01/16/2024    HDL 58.6 01/16/2024     Lab Results   Component Value Date    HGBA1C 5.8 01/11/2024     The 10-year ASCVD risk score (Kiesha ELIAS, et al., 2019) is: 13.8%    Values used to calculate the score:      Age: 63 years      Sex: Female      Is  Non- : No      Diabetic: Yes      Tobacco smoker: No      Systolic Blood Pressure: 135 mmHg      Is BP treated: Yes      HDL Cholesterol: 58.6 mg/dL      Total Cholesterol: 246 mg/dL    Assessment/Plan   Trulicity is approved until 1/18/2025. However, patient has a high deductible plan and insurance will not pay for medication until deductible is met. Patient is unsure on how much her deductible is for the year. Sent Trulicity to [facility] pharmacy and gave pharmacy copay card information to help offset cost. Patient to discuss with . Will follow up in 1 week with decision.    Type 2 diabetes mellitus, is at goal. <7%  Follow up: I recommend diabetes care be 1 week.    Anjelica Juarez, PharmD    Continue all meds under the continuation of care with the referring provider and clinical pharmacy team

## 2024-01-25 NOTE — TELEPHONE ENCOUNTER
Pt states she wants the referral for PT faxed to [facility] in [place]   If you can send at some point tomorrow   She would greatly appreciate it   Thank you

## 2024-01-31 ENCOUNTER — TELEMEDICINE (OUTPATIENT)
Dept: PHARMACY | Facility: HOSPITAL | Age: 64
End: 2024-01-31
Payer: COMMERCIAL

## 2024-01-31 DIAGNOSIS — E11.69 TYPE 2 DIABETES MELLITUS WITH OTHER SPECIFIED COMPLICATION, WITHOUT LONG-TERM CURRENT USE OF INSULIN (MULTI): ICD-10-CM

## 2024-01-31 NOTE — PROGRESS NOTES
Subjective     Patient ID: Nichelle Stahl is a 63 y.o. female who presents for diabetes management.    Diabetes  She presents for her follow-up diabetic visit. She has type 2 diabetes mellitus. There are no hypoglycemic associated symptoms. There are no hypoglycemic complications. Risk factors for coronary artery disease include diabetes mellitus, obesity, hypertension and dyslipidemia. Current diabetic treatment includes oral agent (dual therapy). An ACE inhibitor/angiotensin II receptor blocker is being taken.     Allergies   Allergen Reactions    Penicillins Anaphylaxis, Rash and Swelling     lips swell    Adhesive Unknown    Adhesive Tape-Silicones Rash     Objective     Current DM Pharmacotherapy:   -glipizide 5mg xl 1 po every day    SECONDARY PREVENTION  - Statin? Yes  - ACE-I/ARB? Yes  - Aspirin? No    Current monitoring regimen:   Patient is using: glucometer    SMBG Readings: none to report at todays visit    Any episodes of hypoglycemia? No  Hypoglycemia awareness? No    Pertinent PMH Review:  - PMH of Pancreatitis: No  - PMH/FH of Medullary Thyroid Cancer: No  - PMH of Retinopathy: No  - PMH of Urinary Tract Infections: No    Lab Review  Lab Results   Component Value Date    BILITOT 0.6 01/16/2024    CALCIUM 9.7 01/16/2024    CO2 25 01/16/2024     01/16/2024    CREATININE 0.82 01/16/2024    GLUCOSE 111 (H) 01/16/2024    ALKPHOS 82 01/16/2024    K 3.9 01/16/2024    PROT 7.4 01/16/2024     01/16/2024    AST 19 01/16/2024    ALT 20 01/16/2024    BUN 20 01/16/2024    ANIONGAP 15 01/16/2024    MG 1.80 06/17/2021    ALBUMIN 4.7 01/16/2024     Lab Results   Component Value Date    TRIG 248 (H) 01/16/2024    CHOL 246 (H) 01/16/2024    LDLCALC 138 (H) 01/16/2024    HDL 58.6 01/16/2024     Lab Results   Component Value Date    HGBA1C 5.8 01/11/2024     The 10-year ASCVD risk score (Kiesha ELIAS, et al., 2019) is: 13.8%    Values used to calculate the score:      Age: 63 years      Sex: Female      Is  Non- : No      Diabetic: Yes      Tobacco smoker: No      Systolic Blood Pressure: 135 mmHg      Is BP treated: Yes      HDL Cholesterol: 58.6 mg/dL      Total Cholesterol: 246 mg/dL    Assessment/Plan   Patient did decided to  Trulicity. Patient is to start injection this week and stop metformin due to severe GI upset. Answered all patient questions and concerns with medication. Will follow up in 3 weeks for dose titration.    Type 2 diabetes mellitus, is at goal. <7%  Follow up: I recommend diabetes care be 3 weeks.    Anjelica Juarez, PharmD    Continue all meds under the continuation of care with the referring provider and clinical pharmacy team

## 2024-02-05 ENCOUNTER — HOSPITAL ENCOUNTER (OUTPATIENT)
Dept: RADIOLOGY | Facility: CLINIC | Age: 64
Discharge: HOME | End: 2024-02-05
Payer: COMMERCIAL

## 2024-02-05 ENCOUNTER — OFFICE VISIT (OUTPATIENT)
Dept: ORTHOPEDIC SURGERY | Facility: CLINIC | Age: 64
End: 2024-02-05
Payer: COMMERCIAL

## 2024-02-05 DIAGNOSIS — M54.10 RADICULOPATHY AFFECTING UPPER EXTREMITY: ICD-10-CM

## 2024-02-05 DIAGNOSIS — M79.641 RIGHT HAND PAIN: ICD-10-CM

## 2024-02-05 DIAGNOSIS — M79.644 PAIN OF FINGER OF RIGHT HAND: ICD-10-CM

## 2024-02-05 DIAGNOSIS — M25.511 CHRONIC RIGHT SHOULDER PAIN: ICD-10-CM

## 2024-02-05 DIAGNOSIS — G89.29 CHRONIC RIGHT SHOULDER PAIN: ICD-10-CM

## 2024-02-05 PROCEDURE — 99203 OFFICE O/P NEW LOW 30 MIN: CPT | Performed by: STUDENT IN AN ORGANIZED HEALTH CARE EDUCATION/TRAINING PROGRAM

## 2024-02-05 PROCEDURE — 73130 X-RAY EXAM OF HAND: CPT | Mod: RT

## 2024-02-05 PROCEDURE — 99213 OFFICE O/P EST LOW 20 MIN: CPT | Performed by: STUDENT IN AN ORGANIZED HEALTH CARE EDUCATION/TRAINING PROGRAM

## 2024-02-05 PROCEDURE — 4010F ACE/ARB THERAPY RXD/TAKEN: CPT | Performed by: STUDENT IN AN ORGANIZED HEALTH CARE EDUCATION/TRAINING PROGRAM

## 2024-02-05 PROCEDURE — 1036F TOBACCO NON-USER: CPT | Performed by: STUDENT IN AN ORGANIZED HEALTH CARE EDUCATION/TRAINING PROGRAM

## 2024-02-05 PROCEDURE — 73130 X-RAY EXAM OF HAND: CPT | Mod: RIGHT SIDE | Performed by: STUDENT IN AN ORGANIZED HEALTH CARE EDUCATION/TRAINING PROGRAM

## 2024-02-05 PROCEDURE — 3050F LDL-C >= 130 MG/DL: CPT | Performed by: STUDENT IN AN ORGANIZED HEALTH CARE EDUCATION/TRAINING PROGRAM

## 2024-02-05 PROCEDURE — 3061F NEG MICROALBUMINURIA REV: CPT | Performed by: STUDENT IN AN ORGANIZED HEALTH CARE EDUCATION/TRAINING PROGRAM

## 2024-02-05 NOTE — PROGRESS NOTES
History of Present Illness:  Presents with right hand difficulty with use.  States that she had a fall and shoulder fracture 7 months ago.  Since this time she has had difficulty with using her hand.  Particularly she has difficulty with adducting the small finger.  Also feels that her strength   is not there.  Denies numbness and tingling.  At times she feels pain in the small finger.  Particularly when it has been abducted for a long time.      Review of Systems   GENERAL: Negative for malaise, significant weight loss, fever  MUSCULOSKELETAL: see HPI  NEURO:  Negative    The patient's past medical history, family history, social history, and review of systems were reviewed. History is otherwise negative except as stated in the HPI.    Physical Examination:  General: Alert and oriented to person, place, and time.  No acute distress and breathing comfortably: Pleasant and cooperative with examination.  HEENT: Head is normocephalic and atraumatic.  Neck: Supple, no visible swelling.  Cardiovascular: No palpable tachycardia  Lungs: No audible wheezing or labored breathing  Abdomen: Nondistended.  On musculoskeletal examination, the patient has full elbow range of motion. Tinels at the cubital tunnel and elbow flexion/compression are positive for ulnar nerve symptoms. There is no tenderness to palpation at the lateral epicondyle, the biceps, or the triceps. In regards to the wrist, there is no obvious deformity. Range of motion is full in flexion, extension, pronation, and supination.  Strength is 5/5 in flexion and extension.  There is no tenderness to palpation about the 1st dorsal compartment, the 1st CMC joint, or the TFCC. Tinels at the carpal tunnel and Durkins compression test are negative.  She does have a positive Wartenberg.  5 out of 5 intrinsic strength sensation and motor function are intact in the radial, and median nerve distribution. There is no obvious thenar atrophy, and thenar strength is 5/5.  There is no intrinsic atrophy, and intrinsic strength is 5/5. All fingers are without triggering and are without pain over the A1 pulley.  The patient can make a full composite fist. The hand itself is warm and well perfused. The skin is intact throughout. The contralateral hand/wrist are normal to inspection, range of motion, stability, and strength.    Imaging:  Radiographic notes:  AP lateral and oblique of the hand demonstrate no acute osseous process        Assessment:  Patient with right hand weakness and positive Wartenberg's sign.  She is 7 months from shoulder injury.  I recommended an EMG to further evaluate.  X-rays are without bony injury today.  While we wait for EMG I would like her to get into occupational therapy to work on hand strengthening and mobility.    Plan:   EMG.  At this point, I have recommended an EMG/NCV study to help confirm the diagnosis.  I would like her to work with occupational therapy in the interim.    Follow up:  after EMG    Audelia Bruner MD

## 2024-02-15 ENCOUNTER — HOSPITAL ENCOUNTER (OUTPATIENT)
Dept: NEUROLOGY | Facility: HOSPITAL | Age: 64
Discharge: HOME | End: 2024-02-15
Payer: COMMERCIAL

## 2024-02-15 DIAGNOSIS — M54.10 RADICULOPATHY AFFECTING UPPER EXTREMITY: ICD-10-CM

## 2024-02-15 PROCEDURE — 95910 NRV CNDJ TEST 7-8 STUDIES: CPT

## 2024-02-16 DIAGNOSIS — M79.644 PAIN OF FINGER OF RIGHT HAND: ICD-10-CM

## 2024-02-16 DIAGNOSIS — M54.10 RADICULOPATHY AFFECTING UPPER EXTREMITY: Primary | ICD-10-CM

## 2024-02-21 ENCOUNTER — TELEMEDICINE (OUTPATIENT)
Dept: PHARMACY | Facility: HOSPITAL | Age: 64
End: 2024-02-21
Payer: COMMERCIAL

## 2024-02-21 DIAGNOSIS — E11.69 TYPE 2 DIABETES MELLITUS WITH OTHER SPECIFIED COMPLICATION, WITHOUT LONG-TERM CURRENT USE OF INSULIN (MULTI): ICD-10-CM

## 2024-02-21 RX ORDER — DULAGLUTIDE 1.5 MG/.5ML
1.5 INJECTION, SOLUTION SUBCUTANEOUS
Qty: 2 ML | Refills: 0 | Status: SHIPPED | OUTPATIENT
Start: 2024-02-21 | End: 2024-03-20 | Stop reason: SDUPTHER

## 2024-02-21 NOTE — PROGRESS NOTES
Subjective     Patient ID: Nichelle Stahl is a 63 y.o. female who presents for diabetes management.    Diabetes  She presents for her follow-up diabetic visit. She has type 2 diabetes mellitus. There are no hypoglycemic associated symptoms. There are no hypoglycemic complications. Risk factors for coronary artery disease include diabetes mellitus, obesity, hypertension and dyslipidemia. Current diabetic treatment includes oral agent (dual therapy). An ACE inhibitor/angiotensin II receptor blocker is being taken.     Allergies   Allergen Reactions    Penicillins Anaphylaxis, Rash and Swelling     lips swell    Adhesive Unknown    Adhesive Tape-Silicones Rash     Objective     Current DM Pharmacotherapy:   -glipizide 5mg xl 1 po every day  -Trulicity 0.75 mg weekly    SECONDARY PREVENTION  - Statin? Yes  - ACE-I/ARB? Yes  - Aspirin? No    Current monitoring regimen:   Patient is using: glucometer    SMBG Readings: 80 to 103 fasting    Any episodes of hypoglycemia? No  Hypoglycemia awareness? No    Pertinent PMH Review:  - PMH of Pancreatitis: No  - PMH/FH of Medullary Thyroid Cancer: No  - PMH of Retinopathy: No  - PMH of Urinary Tract Infections: No    Lab Review  Lab Results   Component Value Date    BILITOT 0.6 01/16/2024    CALCIUM 9.7 01/16/2024    CO2 25 01/16/2024     01/16/2024    CREATININE 0.82 01/16/2024    GLUCOSE 111 (H) 01/16/2024    ALKPHOS 82 01/16/2024    K 3.9 01/16/2024    PROT 7.4 01/16/2024     01/16/2024    AST 19 01/16/2024    ALT 20 01/16/2024    BUN 20 01/16/2024    ANIONGAP 15 01/16/2024    MG 1.80 06/17/2021    ALBUMIN 4.7 01/16/2024     Lab Results   Component Value Date    TRIG 248 (H) 01/16/2024    CHOL 246 (H) 01/16/2024    LDLCALC 138 (H) 01/16/2024    HDL 58.6 01/16/2024     Lab Results   Component Value Date    HGBA1C 5.8 01/11/2024     The 10-year ASCVD risk score (Kiesha ELIAS, et al., 2019) is: 13.8%    Values used to calculate the score:      Age: 63 years      Sex:  Female      Is Non- : No      Diabetic: Yes      Tobacco smoker: No      Systolic Blood Pressure: 135 mmHg      Is BP treated: Yes      HDL Cholesterol: 58.6 mg/dL      Total Cholesterol: 246 mg/dL    Assessment/Plan   Patient has had no issues with starting Trulicity. FBG is all in goal, but is starting to get low. Will discontinue glipizide at this time and increase Trulicity to 1.5mg. Will follow up in 4 weeks.    Type 2 diabetes mellitus, is at goal. <7%  STOP glipizide  INCREASE Trulicity to 1.5 mg weekly  Follow up: I recommend diabetes care be 4 weeks.    Anjelica Juarez, PharmD    Continue all meds under the continuation of care with the referring provider and clinical pharmacy team

## 2024-03-04 DIAGNOSIS — E11.69 TYPE 2 DIABETES MELLITUS WITH OTHER SPECIFIED COMPLICATION, WITHOUT LONG-TERM CURRENT USE OF INSULIN (MULTI): Primary | ICD-10-CM

## 2024-03-04 RX ORDER — DULAGLUTIDE 0.75 MG/.5ML
0.75 INJECTION, SOLUTION SUBCUTANEOUS
COMMUNITY
End: 2024-03-04 | Stop reason: SDUPTHER

## 2024-03-04 RX ORDER — DULAGLUTIDE 0.75 MG/.5ML
0.75 INJECTION, SOLUTION SUBCUTANEOUS 2 TIMES WEEKLY
Qty: 2 ML | Refills: 3 | Status: SHIPPED | OUTPATIENT
Start: 2024-03-04 | End: 2024-03-20 | Stop reason: SDUPTHER

## 2024-03-20 ENCOUNTER — TELEMEDICINE (OUTPATIENT)
Dept: PHARMACY | Facility: HOSPITAL | Age: 64
End: 2024-03-20
Payer: COMMERCIAL

## 2024-03-20 DIAGNOSIS — E11.69 TYPE 2 DIABETES MELLITUS WITH OTHER SPECIFIED COMPLICATION, WITHOUT LONG-TERM CURRENT USE OF INSULIN (MULTI): ICD-10-CM

## 2024-03-20 RX ORDER — DULAGLUTIDE 1.5 MG/.5ML
1.5 INJECTION, SOLUTION SUBCUTANEOUS
Qty: 2 ML | Refills: 0 | Status: SHIPPED | OUTPATIENT
Start: 2024-03-20 | End: 2024-04-17 | Stop reason: SDUPTHER

## 2024-03-20 RX ORDER — DULAGLUTIDE 0.75 MG/.5ML
0.75 INJECTION, SOLUTION SUBCUTANEOUS
Qty: 2 ML | Refills: 0 | Status: SHIPPED | OUTPATIENT
Start: 2024-03-20 | End: 2024-04-17 | Stop reason: SDUPTHER

## 2024-03-20 NOTE — PROGRESS NOTES
Subjective     Patient ID: Nichelle Stahl is a 63 y.o. female who presents for diabetes management.    Diabetes  She presents for her follow-up diabetic visit. She has type 2 diabetes mellitus. There are no hypoglycemic associated symptoms. There are no hypoglycemic complications. Risk factors for coronary artery disease include diabetes mellitus, obesity, hypertension and dyslipidemia. Current diabetic treatment includes oral agent (dual therapy). An ACE inhibitor/angiotensin II receptor blocker is being taken.     Allergies   Allergen Reactions    Penicillins Anaphylaxis, Rash and Swelling     lips swell    Adhesive Unknown    Adhesive Tape-Silicones Rash     Objective     Current DM Pharmacotherapy:   -glipizide 5mg xl 1 po every day  -Trulicity 0.75 mg weekly    SECONDARY PREVENTION  - Statin? Yes  - ACE-I/ARB? Yes  - Aspirin? No    Current monitoring regimen:   Patient is using: glucometer    SMBG Readings: 80 to 103 fasting    Any episodes of hypoglycemia? No  Hypoglycemia awareness? No    Pertinent PMH Review:  - PMH of Pancreatitis: No  - PMH/FH of Medullary Thyroid Cancer: No  - PMH of Retinopathy: No  - PMH of Urinary Tract Infections: No    Lab Review  Lab Results   Component Value Date    BILITOT 0.6 01/16/2024    CALCIUM 9.7 01/16/2024    CO2 25 01/16/2024     01/16/2024    CREATININE 0.82 01/16/2024    GLUCOSE 111 (H) 01/16/2024    ALKPHOS 82 01/16/2024    K 3.9 01/16/2024    PROT 7.4 01/16/2024     01/16/2024    AST 19 01/16/2024    ALT 20 01/16/2024    BUN 20 01/16/2024    ANIONGAP 15 01/16/2024    MG 1.80 06/17/2021    ALBUMIN 4.7 01/16/2024     Lab Results   Component Value Date    TRIG 248 (H) 01/16/2024    CHOL 246 (H) 01/16/2024    LDLCALC 138 (H) 01/16/2024    HDL 58.6 01/16/2024     Lab Results   Component Value Date    HGBA1C 5.8 01/11/2024     The 10-year ASCVD risk score (Kiesha ELIAS, et al., 2019) is: 13.8%    Values used to calculate the score:      Age: 63 years      Sex:  Female      Is Non- : No      Diabetic: Yes      Tobacco smoker: No      Systolic Blood Pressure: 135 mmHg      Is BP treated: Yes      HDL Cholesterol: 58.6 mg/dL      Total Cholesterol: 246 mg/dL    Assessment/Plan   Patient was not able to increase Trulicity due to back order of 1.5 mg dose. Will resend Trulicity 1.5 mg to [facility] pharmacy and follow up in 4 weeks.     Type 2 diabetes mellitus, is at goal. <7%  INCREASE Trulicity to 1.5 mg weekly  Follow up: I recommend diabetes care be 4 weeks.    Anjelica Juarez, PharmD    Continue all meds under the continuation of care with the referring provider and clinical pharmacy team

## 2024-04-17 ENCOUNTER — TELEMEDICINE (OUTPATIENT)
Dept: PHARMACY | Facility: HOSPITAL | Age: 64
End: 2024-04-17
Payer: COMMERCIAL

## 2024-04-17 DIAGNOSIS — E11.69 TYPE 2 DIABETES MELLITUS WITH OTHER SPECIFIED COMPLICATION, WITHOUT LONG-TERM CURRENT USE OF INSULIN (MULTI): ICD-10-CM

## 2024-04-17 RX ORDER — DULAGLUTIDE 1.5 MG/.5ML
1.5 INJECTION, SOLUTION SUBCUTANEOUS
Qty: 2 ML | Refills: 0 | Status: SHIPPED | OUTPATIENT
Start: 2024-04-21 | End: 2024-05-15 | Stop reason: SDUPTHER

## 2024-04-17 RX ORDER — DULAGLUTIDE 0.75 MG/.5ML
0.75 INJECTION, SOLUTION SUBCUTANEOUS
Qty: 2 ML | Refills: 0 | Status: SHIPPED | OUTPATIENT
Start: 2024-04-21

## 2024-04-17 NOTE — PROGRESS NOTES
Subjective     Patient ID: Nichelle Stahl is a 63 y.o. female who presents for diabetes management.    Diabetes  She presents for her follow-up diabetic visit. She has type 2 diabetes mellitus. There are no hypoglycemic associated symptoms. There are no hypoglycemic complications. Risk factors for coronary artery disease include diabetes mellitus, obesity, hypertension and dyslipidemia. Current diabetic treatment includes oral agent (dual therapy). An ACE inhibitor/angiotensin II receptor blocker is being taken.     Allergies   Allergen Reactions    Penicillins Anaphylaxis, Rash and Swelling     lips swell    Adhesive Unknown    Adhesive Tape-Silicones Rash     Objective     Current DM Pharmacotherapy:   -glipizide 5mg xl 1 po every day  -Trulicity 0.75 mg weekly    SECONDARY PREVENTION  - Statin? Yes  - ACE-I/ARB? Yes  - Aspirin? No    Current monitoring regimen:   Patient is using: glucometer    SMBG Readings: 80 to 103 fasting    Any episodes of hypoglycemia? No  Hypoglycemia awareness? No    Pertinent PMH Review:  - PMH of Pancreatitis: No  - PMH/FH of Medullary Thyroid Cancer: No  - PMH of Retinopathy: No  - PMH of Urinary Tract Infections: No    Lab Review  Lab Results   Component Value Date    BILITOT 0.6 01/16/2024    CALCIUM 9.7 01/16/2024    CO2 25 01/16/2024     01/16/2024    CREATININE 0.82 01/16/2024    GLUCOSE 111 (H) 01/16/2024    ALKPHOS 82 01/16/2024    K 3.9 01/16/2024    PROT 7.4 01/16/2024     01/16/2024    AST 19 01/16/2024    ALT 20 01/16/2024    BUN 20 01/16/2024    ANIONGAP 15 01/16/2024    MG 1.80 06/17/2021    ALBUMIN 4.7 01/16/2024     Lab Results   Component Value Date    TRIG 248 (H) 01/16/2024    CHOL 246 (H) 01/16/2024    LDLCALC 138 (H) 01/16/2024    HDL 58.6 01/16/2024     Lab Results   Component Value Date    HGBA1C 5.8 01/11/2024     The 10-year ASCVD risk score (Kiesha ELIAS, et al., 2019) is: 13.8%    Values used to calculate the score:      Age: 63 years      Sex:  Female      Is Non- : No      Diabetic: Yes      Tobacco smoker: No      Systolic Blood Pressure: 135 mmHg      Is BP treated: Yes      HDL Cholesterol: 58.6 mg/dL      Total Cholesterol: 246 mg/dL    Assessment/Plan   Patient was not able to increase Trulicity again due to back order of 1.5 mg dose. Will resend Trulicity 1.5 mg to [facility] pharmacy and follow up in 4 weeks.     Type 2 diabetes mellitus, is at goal. <7%  INCREASE Trulicity to 1.5 mg weekly  Follow up: I recommend diabetes care be 4 weeks.    Anjelica Juarez, PharmD    Continue all meds under the continuation of care with the referring provider and clinical pharmacy team

## 2024-04-22 ENCOUNTER — APPOINTMENT (OUTPATIENT)
Dept: ORTHOPEDIC SURGERY | Facility: CLINIC | Age: 64
End: 2024-04-22
Payer: COMMERCIAL

## 2024-04-24 DIAGNOSIS — M71.9 DISORDER OF BURSAE OF SHOULDER REGION, UNSPECIFIED LATERALITY: ICD-10-CM

## 2024-04-24 RX ORDER — TIZANIDINE 4 MG/1
4 TABLET ORAL 2 TIMES DAILY PRN
Qty: 60 TABLET | Refills: 2 | Status: SHIPPED | OUTPATIENT
Start: 2024-04-24 | End: 2024-06-06 | Stop reason: ALTCHOICE

## 2024-04-24 NOTE — TELEPHONE ENCOUNTER
Please schedule an appointment for medicine is gone have sent in 30-day supply.  In the event that you cannot get in before this 30-day supply please call my office.Thanks much Dr. Morgan.

## 2024-05-15 ENCOUNTER — TELEMEDICINE (OUTPATIENT)
Dept: PHARMACY | Facility: HOSPITAL | Age: 64
End: 2024-05-15
Payer: COMMERCIAL

## 2024-05-15 DIAGNOSIS — E11.69 TYPE 2 DIABETES MELLITUS WITH OTHER SPECIFIED COMPLICATION, WITHOUT LONG-TERM CURRENT USE OF INSULIN (MULTI): ICD-10-CM

## 2024-05-15 RX ORDER — DULAGLUTIDE 1.5 MG/.5ML
1.5 INJECTION, SOLUTION SUBCUTANEOUS
Qty: 2 ML | Refills: 0 | Status: SHIPPED | OUTPATIENT
Start: 2024-05-19 | End: 2024-06-16

## 2024-05-15 NOTE — PROGRESS NOTES
Subjective     Patient ID: Nichelle Stahl is a 63 y.o. female who presents for diabetes management.    Diabetes  She presents for her follow-up diabetic visit. She has type 2 diabetes mellitus. There are no hypoglycemic associated symptoms. There are no hypoglycemic complications. Risk factors for coronary artery disease include diabetes mellitus, obesity, hypertension and dyslipidemia. Current diabetic treatment includes oral agent (dual therapy). An ACE inhibitor/angiotensin II receptor blocker is being taken.     Allergies   Allergen Reactions    Penicillins Anaphylaxis, Rash and Swelling     lips swell    Adhesive Unknown    Adhesive Tape-Silicones Rash     Objective     Current DM Pharmacotherapy:   -glipizide 5mg xl 1 po every day  -Trulicity 0.75 mg weekly    SECONDARY PREVENTION  - Statin? Yes  - ACE-I/ARB? Yes  - Aspirin? No    Current monitoring regimen:   Patient is using: glucometer    SMBG Readings: 80 to 103 fasting    Any episodes of hypoglycemia? No  Hypoglycemia awareness? No    Pertinent PMH Review:  - PMH of Pancreatitis: No  - PMH/FH of Medullary Thyroid Cancer: No  - PMH of Retinopathy: No  - PMH of Urinary Tract Infections: No    Lab Review  Lab Results   Component Value Date    BILITOT 0.6 01/16/2024    CALCIUM 9.7 01/16/2024    CO2 25 01/16/2024     01/16/2024    CREATININE 0.82 01/16/2024    GLUCOSE 111 (H) 01/16/2024    ALKPHOS 82 01/16/2024    K 3.9 01/16/2024    PROT 7.4 01/16/2024     01/16/2024    AST 19 01/16/2024    ALT 20 01/16/2024    BUN 20 01/16/2024    ANIONGAP 15 01/16/2024    MG 1.80 06/17/2021    ALBUMIN 4.7 01/16/2024     Lab Results   Component Value Date    TRIG 248 (H) 01/16/2024    CHOL 246 (H) 01/16/2024    LDLCALC 138 (H) 01/16/2024    HDL 58.6 01/16/2024     Lab Results   Component Value Date    HGBA1C 5.8 01/11/2024     The 10-year ASCVD risk score (Kiesha ELIAS, et al., 2019) is: 13.8%    Values used to calculate the score:      Age: 63 years      Sex:  Female      Is Non- : No      Diabetic: Yes      Tobacco smoker: No      Systolic Blood Pressure: 135 mmHg      Is BP treated: Yes      HDL Cholesterol: 58.6 mg/dL      Total Cholesterol: 246 mg/dL    Assessment/Plan   Patient was not able to increase Trulicity again due to back order of 1.5 mg dose. Will resend Trulicity 1.5 mg to [facility] pharmacy and follow up in 1 week. If patient still unable to increase dose. Will switch to either Ozempic or Mounjaro. Will follow up in 1 week.    Type 2 diabetes mellitus, is at goal. <7%  INCREASE Trulicity to 1.5 mg weekly  Follow up: I recommend diabetes care be 1 week.    Anjelica Juarez, PharmD    Continue all meds under the continuation of care with the referring provider and clinical pharmacy team

## 2024-05-22 ENCOUNTER — APPOINTMENT (OUTPATIENT)
Dept: PRIMARY CARE | Facility: CLINIC | Age: 64
End: 2024-05-22
Payer: COMMERCIAL

## 2024-05-29 ENCOUNTER — TELEPHONE (OUTPATIENT)
Dept: PRIMARY CARE | Facility: CLINIC | Age: 64
End: 2024-05-29
Payer: COMMERCIAL

## 2024-05-29 NOTE — TELEPHONE ENCOUNTER
Pt states her Trulicity went from $25.00 to $295 and that is too much. She is requesting a Manufacture Coupon ?  Please Advise

## 2024-05-31 ENCOUNTER — TELEPHONE (OUTPATIENT)
Dept: PHARMACY | Facility: HOSPITAL | Age: 64
End: 2024-05-31
Payer: COMMERCIAL

## 2024-05-31 NOTE — TELEPHONE ENCOUNTER
I reviewed the progress note and agree with the resident’s findings and plans as written. Case discussed with resident.    Genrao Thompson, PharmD

## 2024-05-31 NOTE — TELEPHONE ENCOUNTER
Spoke with Ms. Nichelle Stahl after being contacted from Dr. Morgan's team on 5/31/24 regarding copays on Trulicity. Patient's copay went from 25/month in April to 295/month WITH copay card applied in May. Called [facility] to confirm/inquire on reasoning for change, they were unable to provide further information. Ran test claim and saw patient could potentially be in deductible window at this time.     Spoke & discussed with patient options moving forward such as  PAP - patient reports household of 4 would make too much to qualify for PAP; asked if she would like to consider application process still but patient was not interested. At this time, advised patient to call insurance to verify if in deductible period and to follow up with primary care pharmacist regarding treatment options.     No other further questions or concerns at this time.    Lula Topete, PharmD  PGY-1 Community Pharmacy Resident   [facility]

## 2024-06-06 ENCOUNTER — OFFICE VISIT (OUTPATIENT)
Dept: PRIMARY CARE | Facility: CLINIC | Age: 64
End: 2024-06-06
Payer: COMMERCIAL

## 2024-06-06 VITALS
SYSTOLIC BLOOD PRESSURE: 136 MMHG | WEIGHT: 242 LBS | HEART RATE: 75 BPM | BODY MASS INDEX: 37.98 KG/M2 | DIASTOLIC BLOOD PRESSURE: 70 MMHG | HEIGHT: 67 IN | TEMPERATURE: 97 F | RESPIRATION RATE: 18 BRPM | OXYGEN SATURATION: 96 %

## 2024-06-06 DIAGNOSIS — M71.9 DISORDER OF BURSAE OF SHOULDER REGION, UNSPECIFIED LATERALITY: ICD-10-CM

## 2024-06-06 DIAGNOSIS — K21.00 GASTROESOPHAGEAL REFLUX DISEASE WITH ESOPHAGITIS, UNSPECIFIED WHETHER HEMORRHAGE: ICD-10-CM

## 2024-06-06 DIAGNOSIS — J45.909 ASTHMA, UNSPECIFIED ASTHMA SEVERITY, UNSPECIFIED WHETHER COMPLICATED, UNSPECIFIED WHETHER PERSISTENT (HHS-HCC): ICD-10-CM

## 2024-06-06 DIAGNOSIS — E11.69 TYPE 2 DIABETES MELLITUS WITH OTHER SPECIFIED COMPLICATION, WITHOUT LONG-TERM CURRENT USE OF INSULIN (MULTI): Primary | ICD-10-CM

## 2024-06-06 DIAGNOSIS — M54.2 NECK PAIN: ICD-10-CM

## 2024-06-06 DIAGNOSIS — E55.9 VITAMIN D DEFICIENCY: ICD-10-CM

## 2024-06-06 DIAGNOSIS — E04.1 THYROID NODULE: ICD-10-CM

## 2024-06-06 DIAGNOSIS — I10 HYPERTENSION, UNSPECIFIED TYPE: ICD-10-CM

## 2024-06-06 DIAGNOSIS — M54.6 ACUTE MIDLINE THORACIC BACK PAIN: ICD-10-CM

## 2024-06-06 PROBLEM — M47.817 LUMBOSACRAL SPONDYLOSIS WITHOUT MYELOPATHY: Status: ACTIVE | Noted: 2024-05-07

## 2024-06-06 LAB — POC HEMOGLOBIN A1C: 5.8 % (ref 4.2–6.5)

## 2024-06-06 PROCEDURE — 3061F NEG MICROALBUMINURIA REV: CPT | Performed by: FAMILY MEDICINE

## 2024-06-06 PROCEDURE — 3050F LDL-C >= 130 MG/DL: CPT | Performed by: FAMILY MEDICINE

## 2024-06-06 PROCEDURE — 3075F SYST BP GE 130 - 139MM HG: CPT | Performed by: FAMILY MEDICINE

## 2024-06-06 PROCEDURE — 99214 OFFICE O/P EST MOD 30 MIN: CPT | Performed by: FAMILY MEDICINE

## 2024-06-06 PROCEDURE — 4010F ACE/ARB THERAPY RXD/TAKEN: CPT | Performed by: FAMILY MEDICINE

## 2024-06-06 PROCEDURE — 83036 HEMOGLOBIN GLYCOSYLATED A1C: CPT | Performed by: FAMILY MEDICINE

## 2024-06-06 PROCEDURE — 3078F DIAST BP <80 MM HG: CPT | Performed by: FAMILY MEDICINE

## 2024-06-06 RX ORDER — TIZANIDINE 4 MG/1
4 TABLET ORAL 2 TIMES DAILY PRN
Qty: 60 TABLET | Refills: 2 | Status: SHIPPED | OUTPATIENT
Start: 2024-06-06

## 2024-06-06 RX ORDER — GLIPIZIDE 5 MG/1
TABLET, FILM COATED, EXTENDED RELEASE ORAL
Qty: 90 TABLET | Refills: 3 | Status: SHIPPED | OUTPATIENT
Start: 2024-06-06

## 2024-06-06 NOTE — ASSESSMENT & PLAN NOTE
Patient is here for a recheck of diabetes.hgba1c  was  5.8     checks his blood sugars.  once richard.ly . Highest sugar was  120  low sugar.. 78   remains compliant on his medications.,,, Hypoglycemia has occurred rarely or never.... Symptoms do not include polydipsia, polyuria, blurred vision, numbness and tingling in the toes, increased appetite, weight gain, weight loss, infections or foot problems.. baby aspirin   81mg......on....    eye exam  2023        feet problems.... thickened toe nail.... stable and continue meds

## 2024-06-06 NOTE — ASSESSMENT & PLAN NOTE
Patient is also here for follow-up of hypertension.. Symptoms do not include headache confusion visual disturbance dizziness shortness of breath chest pain syncope or palpitations. Recent blood pressure patient has   been checking. By report there is good compliance with treatment. Pertinent medical history includes diabetes/hyperlipidemia  Monitor your blood pressure at home and notify if blood pressure consistently over 140 systolic 90 diastolic  stable and continue meds    v

## 2024-06-06 NOTE — PROGRESS NOTES
Subjective   Patient ID: Nichelle Stahl is a 63 y.o. female who presents for No chief complaint on file..  HPI  Patient here for follow-up since last visit she has had multiple tests for upper extremity issue since fall.  She did have EMG nerve conduction velocity andAlso saw orthopedics.  Review of orthopedic note indicates right hand difficulty he had fall shoulder fracture 7 months prior to visit in February.  Examination revealed abnormalities with right hand weakness and positive Wartenberg's sign..  Patient was given EMG study and Occupational Therapy to work on hand and strengthening.  Today she states she is wondering if this is due to a neck injury since she really did not see anyone following the accident just went to the orthopedic office.  She does have neck stiffness and pain in the neck.  Review of chart for C-spine x-rays revealed that x-rays of the neck were previously ordered 6/7/2023 however patient has had not had any x-rays since last summer...     The reflux esophagitis he has been occurring in a recurrent pattern for years. The course has been without change. The reflux is described as mild  . The patient remains compliant on meds   omeprazole  The patient takes medications in a daily  fashion. Denies dysphagia hoarseness or odynophagia...   Patient is also here for follow-up of hypertension.. Symptoms do not include headache confusion visual disturbance dizziness shortness of breath chest pain syncope or palpitations. Recent blood pressure patient has   been checking. By report there is good compliance with treatment. Pertinent medical history includes diabetes/hyperlipidemia   Patient is here for a recheck of diabetes.hgba1c  was  5.8     checks his blood sugars.  once richard.ly . Highest sugar was  120  low sugar.. 78   remains compliant on his medications.,,, Hypoglycemia has occurred rarely or never.... Symptoms do not include polydipsia, polyuria, blurred vision, numbness and tingling in the  toes, increased appetite, weight gain, weight loss, infections or foot problems.. baby aspirin   81mg......on....    eye exam  2023        feet problems.... thickened toe nail....        Review of Systems  cardiovascular:  no  palpitations or chest  pain  respiratory: no  shortness  of  breath  endocrine:  no polydipsia,  no polyuria  musculoskeletal:  no  myalgia.. no arthralgia  All other  systems discussed  negative   Objective   Physical Exam  Vitals: I have reviewed the vitals  General: Well-developed.  In no acute distress.  Eyes:   sclera nonicteric.  Conjunctiva not injected.  No discharge.   HEAD: Normocephalic, atraumatic.  HEENT   Mucous membranes moist.  Posterior oropharynx nonerythematous, no tonsillar exudates.      No cervical lymphadenopathy.  Cardio: Regular rate and rhythm.  No murmur, rub or gallop.  Pulmonary: Lungs clear to auscultation in all fields.  No accessory muscle use.  GI/: Normal active bowel sounds.  Soft, nontender.  No masses or organomegaly appreciated.  Musculoskeletal: No gross deformities appreciated.  Neuro: Alert, age-appropriate.  Normal muscle tone.  Moving all extremities.  Skin: No rash, bruises or lesions.    Gait/Station  -normal gait  Cervical spine-abnormal strength and tone, abnormal cervical spine movements, sensation normal, no tenderness to palpation.  Thoracic spine-normal strength and tone, normal thoracic spine movements, sensation normal, no tenderness to palpation.     Labs       Contains abnormal data Lipid Panel  Order: [id number]   Status: Final result       Visible to patient: Yes (seen)       Dx: Hyperlipidemia, unspecified hyperlipi...    1 Result Note       1  Topic           Component  Ref Range & Units 4 mo ago 1 yr ago 2 yr ago 3 yr ago 4 yr ago 5 yr ago   Cholesterol  0 - 199 mg/dL 246 High  231 High     High   High  CM   Comment:      Age      Desirable   Borderline High   High     0-19 Y     0 - 169       170 - 199      >/= 200    20-24 Y     0 - 189       190 - 224     >/= 225        >24 Y     0 - 199       200 - 239     >/= 240   **All ranges are based on fasting samples. Specific   therapeutic targets will vary based on patient-specific   cardiac risk.    Pediatric guidelines reference:Pediatrics 2011, 128(S5).Adult guidelines reference: NCEP ATPIII Guidelines,JOSE ROBERTO 2001, 258:2486-97    Venipuncture immediately after or during the administration of Metamizole may lead to falsely low results. Testing should be performed immediately prior to Metamizole dosing.   HDL-Cholesterol  mg/dL 58.6 58.7 CM 54.0 CM 49.0 CM 46.0 CM 46.9 CM   Comment:  Age       Very Low   Low     Normal    High    0-19 Y    < 35      < 40     40-45     ----  20-24 Y    ----     < 40      >45      ----        >24 Y      ----     < 40     40-60      >60   Cholesterol/HDL Ratio 4.2 3.9 CM 3.6 CM 3.7 CM 5.2 Abnormal  CM 5.1 Abnormal  CM   Comment:  Ref Values  Desirable  < 3.4  High Risk  > 5.0   LDL Calculated  <=99 mg/dL 138 High  103 High  R, CM 78 R         CBC and Auto Differential  Order: [id number]   Status: Final result       Visible to patient: Yes (seen)       Dx: Type 2 diabetes mellitus with other s...    1 Result Note          Component  Ref Range & Units 4 mo ago  (1/16/24) 2 yr ago  (5/19/22) 2 yr ago  (6/17/21) 3 yr ago  (11/2/20) 5 yr ago  (5/22/19)   WBC  4.4 - 11.3 x10*3/uL 6.1 5.2 R 5.2 R 4.6 R 5.4 R   nRBC  0.0 - 0.0 /100 WBCs 0.0    0.0 R   RBC  4.00 - 5.20 x10*6/uL 4.99 4.92 R 4.89 R 4.43 R 4.98 R   Hemoglobin  12.0 - 16.0 g/dL 15.3 14.9 14.5 13.1 14.9   Hematocrit  36.0 - 46.0 % 45.5 44.7 45.2 40.1 46.0   MCV  80 - 100 fL 91 91 92 91 92   MCH  26.0 - 34.0 pg 30.7       MCHC  32.0 - 36.0 g/dL 33.6 33.3 32.1 32.7 32.4   RDW  11.5 - 14.5 % 12.8 13.2 13.5 12.7 12.7   Platelets  150 - 450 x10*3/uL 236 200 R 193 R 213 R 207 R   Neutrophils %  40.0 - 80.0 % 65.1 62.8 69.8 59.6 59.6   Immature Granulocytes %, Automated  0.0 - 0.9 % 0.2 0.2 CM 0.8  CM 0.7 CM 0.7 CM   Comment: Immature Granulocyte Count (IG) includes promyelocytes, myelocytes and metamyelocytes but does not include bands. Percent differential counts (%) should be interpreted in the context of the absolute cell counts (cells/UL).   Lymphocytes %  13.0 - 44.0 % 25.1 26.5 20.7 26.1 28.9   Monocytes %  2.0 - 10.0 % 6.7 7.4 5.8 8.1 6.6   Eosinophils %  0.0 - 6.0 % 1.8 2.1 1.7 4.2 2.9   Basophils %  0.0 - 2.0 % 1.1 1.0 1.2 1.3 1.3   Neutrophils Absolute  1.20 - 7.70 x10*3/uL 3.96 3.29 R 3.60 R 2.72 R 3.23 R   Comment: Percent differential counts (%) should be interpreted in the context of the absolute cell counts (cells/uL).   Immature Granulocytes Absolute, Automated  0.00 - 0.70 x10*3/uL 0.01       Lymphocytes Absolute  1.20 - 4.80 x10*3/uL 1.53 1.39 R 1.07 Low  R 1.19 Low  R 1.57 R   Monocytes Absolute  0.10 - 1.00 x10*3/uL 0.41 0.39 R 0.30 R 0.37 R 0.36 R   Eosinophils Absolute  0.00 - 0.70 x10*3/uL 0.11 0.11 R 0.09 R 0.19 R 0.16 R   Basophils Absolute  0.00 - 0.10 x10*3/uL 0.07 0.05 R 0.06 R 0.06 R 0.07 R   Resulting Agency [facility]              Specimen Collected: 01/16/24 14:26 Last Resulted: 01/16/24 20:13           Assessment/Plan   Problem List Items Addressed This Visit       Hypertension     Patient is also here for follow-up of hypertension.. Symptoms do not include headache confusion visual disturbance dizziness shortness of breath chest pain syncope or palpitations. Recent blood pressure patient has   been checking. By report there is good compliance with treatment. Pertinent medical history includes diabetes/hyperlipidemia  Monitor your blood pressure at home and notify if blood pressure consistently over 140 systolic 90 diastolic  stable and continue meds    v          Relevant Orders    Comprehensive Metabolic Panel    Magnesium    Vitamin D deficiency    Relevant Orders    Vitamin D 25-Hydroxy,Total (for eval of  Vitamin D levels)    Thyroid nodule    Relevant Orders    Thyroid Stimulating Hormone    Thyroxine, Free    Reflux esophagitis      The reflux esophagitis he has been occurring in a recurrent pattern for years. The course has been without change. The reflux is described as mild  . The patient remains compliant on meds   omeprazole  The patient takes medications in a daily  fashion. Denies dysphagia hoarseness or odynophagia... stable and continue meds             Relevant Orders    CBC and Auto Differential    Vitamin B12    Asthma (HHS-HCC)    Type 2 diabetes mellitus (Multi) - Primary     Patient is here for a recheck of diabetes.hgba1c  was  5.8     checks his blood sugars.  once richard.ly . Highest sugar was  120  low sugar.. 78   remains compliant on his medications.,,, Hypoglycemia has occurred rarely or never.... Symptoms do not include polydipsia, polyuria, blurred vision, numbness and tingling in the toes, increased appetite, weight gain, weight loss, infections or foot problems.. baby aspirin   81mg......on....    eye exam  2023        feet problems.... thickened toe nail.... stable and continue meds           Relevant Medications    glipiZIDE XL (Glucotrol XL) 5 mg 24 hr tablet    Other Relevant Orders    POCT glycosylated hemoglobin (Hb A1C) manually resulted (Completed)    Lipid Panel    Disorder of bursae of shoulder region    Relevant Medications    tiZANidine (Zanaflex) 4 mg tablet     Other Visit Diagnoses       Neck pain        Relevant Orders    CT cervical spine wo IV contrast    Acute midline thoracic back pain        Relevant Orders    XR thoracic spine 3 views        G & nerve conduction  Status: Final result     Study Result    Narrative     Referring provider: Audelia Bruner MD  [id number]   [facility], [address] [place] [id number]    Reason for the study:  The patient was having difficulty the neck pain, problem with the  shoulders and the upper  extremities.  Her problem started after she fell  down and injured herself  Patient developed a shuffling gait, micrographia suggestive of Parkinson  disease  Comment:    Motor nerve conduction velocities are normal in all the nerves tested    F-wave latencies are normal in all the nerves tested    Distal motor latencies are delayed in the median nerves being worse on the  right side and normal in other nerves tested    Distal sensory latencies are normal in all the nerves tested    Amplitude of motor responses is borderline in the right median nerve and  normal in all the nerves tested    Amplitude of sensory responses is normal in all the nerves tested    On the Concentric needle electrode examination no significant denervation  changes seen      Clinical interpretation:  Mild to moderate bilateral median nerve compression neuropathy at the  wrist consistent with diagnosis of mild to moderate bilateral carpal  tunnel syndrome being worse on the right side  The patient could be tried on conservative management with the hand  therapy.  If symptoms of carpal tunnel syndrome continue or worsen she  will need a decompression of the right median nerve to start with the  At this time she does not have evidence of cervical radiculopathy  Patient needs to be watched for Parkinson disease  If clinically indicated we could repeat the study in a year  Thank you for the referral  Please feel free to call me if I can be of any further assistance  regarding this patient's evaluation

## 2024-06-06 NOTE — ASSESSMENT & PLAN NOTE
The reflux esophagitis he has been occurring in a recurrent pattern for years. The course has been without change. The reflux is described as mild  . The patient remains compliant on meds   omeprazole  The patient takes medications in a daily  fashion. Denies dysphagia hoarseness or odynophagia... stable and continue meds

## 2024-06-10 ENCOUNTER — APPOINTMENT (OUTPATIENT)
Dept: PHARMACY | Facility: HOSPITAL | Age: 64
End: 2024-06-10
Payer: COMMERCIAL

## 2024-06-14 ENCOUNTER — TELEPHONE (OUTPATIENT)
Dept: PRIMARY CARE | Facility: CLINIC | Age: 64
End: 2024-06-14
Payer: COMMERCIAL

## 2024-06-17 ENCOUNTER — APPOINTMENT (OUTPATIENT)
Dept: PHARMACY | Facility: HOSPITAL | Age: 64
End: 2024-06-17
Payer: COMMERCIAL

## 2024-06-18 ENCOUNTER — APPOINTMENT (OUTPATIENT)
Dept: RADIOLOGY | Facility: HOSPITAL | Age: 64
End: 2024-06-18
Payer: COMMERCIAL

## 2024-06-19 ENCOUNTER — TELEPHONE (OUTPATIENT)
Dept: PRIMARY CARE | Facility: CLINIC | Age: 64
End: 2024-06-19
Payer: COMMERCIAL

## 2024-06-19 DIAGNOSIS — M48.02 CERVICAL SPINAL STENOSIS: Primary | ICD-10-CM

## 2024-06-19 NOTE — PROGRESS NOTES
Discussed with medical reviewer regarding CT versus MRI.  OF NECK .  In light of weakness will order MRI and approved.  Will inform patient of such and   PLEASE  assist with scheduling

## 2024-06-21 DIAGNOSIS — F41.9 ANXIETY: ICD-10-CM

## 2024-06-21 DIAGNOSIS — F41.9 ANXIETY: Primary | ICD-10-CM

## 2024-06-21 RX ORDER — CLONAZEPAM 1 MG/1
TABLET ORAL
Qty: 2 TABLET | Refills: 0 | Status: SHIPPED | OUTPATIENT
Start: 2024-06-21

## 2024-06-22 DIAGNOSIS — I10 HYPERTENSION, UNSPECIFIED TYPE: ICD-10-CM

## 2024-06-24 RX ORDER — DOXYCYCLINE 100 MG/1
1 CAPSULE ORAL
COMMUNITY
Start: 2024-04-22

## 2024-06-24 RX ORDER — LISINOPRIL 20 MG/1
20 TABLET ORAL DAILY
Qty: 90 TABLET | Refills: 1 | Status: SHIPPED | OUTPATIENT
Start: 2024-06-24

## 2024-06-26 RX ORDER — CLONAZEPAM 1 MG/1
TABLET ORAL
Qty: 2 TABLET | Refills: 0 | Status: SHIPPED | OUTPATIENT
Start: 2024-06-26

## 2024-07-01 ENCOUNTER — HOSPITAL ENCOUNTER (OUTPATIENT)
Dept: RADIOLOGY | Facility: HOSPITAL | Age: 64
Discharge: HOME | End: 2024-07-01
Payer: COMMERCIAL

## 2024-07-01 DIAGNOSIS — M48.02 CERVICAL SPINAL STENOSIS: ICD-10-CM

## 2024-07-01 PROCEDURE — 72141 MRI NECK SPINE W/O DYE: CPT

## 2024-07-01 PROCEDURE — 72141 MRI NECK SPINE W/O DYE: CPT | Performed by: RADIOLOGY

## 2024-07-08 ENCOUNTER — TELEPHONE (OUTPATIENT)
Dept: PRIMARY CARE | Facility: CLINIC | Age: 64
End: 2024-07-08
Payer: COMMERCIAL

## 2024-07-08 ENCOUNTER — OFFICE VISIT (OUTPATIENT)
Dept: ORTHOPEDIC SURGERY | Facility: CLINIC | Age: 64
End: 2024-07-08
Payer: COMMERCIAL

## 2024-07-08 DIAGNOSIS — G56.01 CARPAL TUNNEL SYNDROME OF RIGHT WRIST: Primary | ICD-10-CM

## 2024-07-08 PROCEDURE — 3061F NEG MICROALBUMINURIA REV: CPT | Performed by: STUDENT IN AN ORGANIZED HEALTH CARE EDUCATION/TRAINING PROGRAM

## 2024-07-08 PROCEDURE — 2500000005 HC RX 250 GENERAL PHARMACY W/O HCPCS: Performed by: STUDENT IN AN ORGANIZED HEALTH CARE EDUCATION/TRAINING PROGRAM

## 2024-07-08 PROCEDURE — 99214 OFFICE O/P EST MOD 30 MIN: CPT | Mod: 25 | Performed by: STUDENT IN AN ORGANIZED HEALTH CARE EDUCATION/TRAINING PROGRAM

## 2024-07-08 PROCEDURE — 99214 OFFICE O/P EST MOD 30 MIN: CPT | Performed by: STUDENT IN AN ORGANIZED HEALTH CARE EDUCATION/TRAINING PROGRAM

## 2024-07-08 PROCEDURE — 1036F TOBACCO NON-USER: CPT | Performed by: STUDENT IN AN ORGANIZED HEALTH CARE EDUCATION/TRAINING PROGRAM

## 2024-07-08 PROCEDURE — 20526 THER INJECTION CARP TUNNEL: CPT | Mod: RT | Performed by: STUDENT IN AN ORGANIZED HEALTH CARE EDUCATION/TRAINING PROGRAM

## 2024-07-08 PROCEDURE — 4010F ACE/ARB THERAPY RXD/TAKEN: CPT | Performed by: STUDENT IN AN ORGANIZED HEALTH CARE EDUCATION/TRAINING PROGRAM

## 2024-07-08 PROCEDURE — 2500000004 HC RX 250 GENERAL PHARMACY W/ HCPCS (ALT 636 FOR OP/ED): Performed by: STUDENT IN AN ORGANIZED HEALTH CARE EDUCATION/TRAINING PROGRAM

## 2024-07-08 PROCEDURE — 3050F LDL-C >= 130 MG/DL: CPT | Performed by: STUDENT IN AN ORGANIZED HEALTH CARE EDUCATION/TRAINING PROGRAM

## 2024-07-08 RX ORDER — LIDOCAINE HYDROCHLORIDE 10 MG/ML
0.5 INJECTION INFILTRATION; PERINEURAL
Status: COMPLETED | OUTPATIENT
Start: 2024-07-08 | End: 2024-07-08

## 2024-07-08 NOTE — TELEPHONE ENCOUNTER
----- Message from Dayne Morgan DO sent at 7/3/2024  5:30 PM EDT -----  Maybe schedule virtual visit to discuss MRI

## 2024-07-08 NOTE — PROGRESS NOTES
History of Present Illness  Patient returns today for evaluation of right hand.  Had EMG in February.  This did show moderate carpal tunnel.  She has been occupational therapy and has had some improvement in her hand range of motion but continues to have weakness numbness and tingling..     Physical Examination:  Right upper extremity:  The patient appears to be their stated age, is in no apparent distress, and is oriented x3. The patients mood and affect are appropriate. The patients gait is normal. The examination of the limb in question was performed in comparison to the contralateral limb.    On musculoskeletal examination, positive Phalen Durkan at the carpal tunnel    Sensation and motor function are intact in the radial, and median nerve distribution. There is no obvious thenar atrophy, and thenar strength is 5/5. There is no intrinsic atrophy, and intrinsic strength is 5/5.  The patient can make a full composite fist. The hand itself is warm and well perfused. The skin is intact throughout. The contralateral hand/wrist are normal to inspection, range of motion, stability, and strength.    Hand / UE Inj/Asp: R carpal tunnel for carpal tunnel syndrome on 7/8/2024 3:59 PM  Indications: pain and diagnostic  Details: 24 G needle, volar approach  Medications: 10 mg triamcinolone acetonide 10 mg/mL; 0.5 mL lidocaine 10 mg/mL (1 %)  Outcome: tolerated well, no immediate complications  Procedure, treatment alternatives, risks and benefits explained, specific risks discussed. Immediately prior to procedure a time out was called to verify the correct patient, procedure, equipment, support staff and site/side marked as required. Patient was prepped and draped in the usual sterile fashion.               Assessment:  Patient with right carpal tunnel.    Plan:   She has had some improvement in motion with occupational therapy.  Recommend continuing occupational therapy.  I would like to offer her a cortisone  injection today to see how much relief results from that..  I will see her back in 1 month for repeat clinical assessment    Injection.  I explained the risks and benefits of an injection. Specifically, I reviewed the risks of injection, which include, but are not limited to, infection, bleeding, nerve injury, pain, steroid flare, glycemic alteration, subcutaneous fat atrophy, skin hypopigmentation, soft tissue damage, and incomplete symptom relief. At this time, the patient would like to proceed with an injection. After obtaining consent, I injected a 1mL combination of Kenalog and 1% lidocaine into right CT, sterile technique. The injection site was dressed, and the patient tolerated the injection well. I am hopeful that this injection will serve diagnostic, prognostic, and therapeutic purposes. Finally, I have emphasized patience, as any benefit may take several weeks to manifest. Depending on the success of the injection, I will see them back 4 weeks      Audelia Morgan MD

## 2024-07-24 ENCOUNTER — OFFICE VISIT (OUTPATIENT)
Dept: PRIMARY CARE | Facility: CLINIC | Age: 64
End: 2024-07-24
Payer: COMMERCIAL

## 2024-07-24 VITALS
WEIGHT: 236 LBS | TEMPERATURE: 96.7 F | DIASTOLIC BLOOD PRESSURE: 80 MMHG | OXYGEN SATURATION: 97 % | SYSTOLIC BLOOD PRESSURE: 138 MMHG | HEIGHT: 67 IN | HEART RATE: 72 BPM | RESPIRATION RATE: 18 BRPM | BODY MASS INDEX: 37.04 KG/M2

## 2024-07-24 DIAGNOSIS — G25.9 MOVEMENT DISORDER: ICD-10-CM

## 2024-07-24 DIAGNOSIS — H61.23 BILATERAL IMPACTED CERUMEN: ICD-10-CM

## 2024-07-24 DIAGNOSIS — I10 HYPERTENSION, UNSPECIFIED TYPE: Primary | ICD-10-CM

## 2024-07-24 DIAGNOSIS — E78.5 HYPERLIPIDEMIA, UNSPECIFIED HYPERLIPIDEMIA TYPE: ICD-10-CM

## 2024-07-24 PROCEDURE — 99214 OFFICE O/P EST MOD 30 MIN: CPT | Performed by: FAMILY MEDICINE

## 2024-07-24 PROCEDURE — 4010F ACE/ARB THERAPY RXD/TAKEN: CPT | Performed by: FAMILY MEDICINE

## 2024-07-24 PROCEDURE — 3061F NEG MICROALBUMINURIA REV: CPT | Performed by: FAMILY MEDICINE

## 2024-07-24 PROCEDURE — 3050F LDL-C >= 130 MG/DL: CPT | Performed by: FAMILY MEDICINE

## 2024-07-24 PROCEDURE — 3075F SYST BP GE 130 - 139MM HG: CPT | Performed by: FAMILY MEDICINE

## 2024-07-24 PROCEDURE — 3079F DIAST BP 80-89 MM HG: CPT | Performed by: FAMILY MEDICINE

## 2024-07-24 PROCEDURE — 69210 REMOVE IMPACTED EAR WAX UNI: CPT | Performed by: FAMILY MEDICINE

## 2024-07-24 PROCEDURE — 3008F BODY MASS INDEX DOCD: CPT | Performed by: FAMILY MEDICINE

## 2024-07-24 RX ORDER — GENTAMICIN SULFATE 3 MG/ML
SOLUTION/ DROPS OPHTHALMIC
Qty: 15 ML | Refills: 2 | Status: SHIPPED | OUTPATIENT
Start: 2024-07-24

## 2024-07-24 RX ORDER — AMLODIPINE BESYLATE 2.5 MG/1
2.5 TABLET ORAL DAILY
Qty: 30 TABLET | Refills: 5 | Status: SHIPPED | OUTPATIENT
Start: 2024-07-24 | End: 2025-01-20

## 2024-07-24 RX ORDER — ACETIC ACID 20.65 MG/ML
SOLUTION AURICULAR (OTIC)
Qty: 15 ML | Refills: 0 | Status: SHIPPED | OUTPATIENT
Start: 2024-07-24

## 2024-07-24 RX ORDER — FUROSEMIDE 20 MG/1
TABLET ORAL
COMMUNITY

## 2024-07-24 RX ORDER — DIAZEPAM 5 MG/1
TABLET ORAL
Qty: 2 TABLET | Refills: 0 | Status: SHIPPED | OUTPATIENT
Start: 2024-07-24

## 2024-07-24 NOTE — ASSESSMENT & PLAN NOTE
Patient is also here for follow-up of hyperlipidemia. The most recent measurements for this patient would take it on.. 1/16/24 At that time.. Total cholesterol..246.. HDL 58      triglycerides  248     .... Associated symptoms do not include chest pain, Cramps with exertion, myalgias or nausea. Current treatment includes statins. By report there is good compliance with treatment. Pertinent medical history includes diabetes and hypertension reviewed lipid numbers and somewhat elevated i.e.  will get repeat and patient encouraged to get done using previous lab order.

## 2024-07-24 NOTE — ASSESSMENT & PLAN NOTE
Ear irrigation performed erythema of external auditory canal noted will use Garamycin drops with intent to resolve and advised to use acetic acid drops after showering daily

## 2024-07-24 NOTE — ASSESSMENT & PLAN NOTE
Patient is also here for follow-up of hypertension.. Symptoms do not include headache confusion visual disturbance dizziness shortness of breath chest pain syncope or palpitations. Recent blood pressure patient has not been checking. By report there is good compliance with treatment. Pertinent medical history includes diabetes/hyperlipidemia.... Uncontrolled and amlodipine

## 2024-07-24 NOTE — PROGRESS NOTES
Patient ID: Nichelle Stahl is a 63 y.o. female.    Ear Cerumen Removal    Date/Time: 7/24/2024 2:09 PM    Performed by: Dayne Morgan DO  Authorized by: Dayne Morgan DO    Consent:     Consent obtained:  Verbal    Consent given by:  Patient    Risks, benefits, and alternatives were discussed: yes      Risks discussed:  Pain and dizziness  Universal protocol:     Patient identity confirmed:  Verbally with patient  Procedure details:     Location:  R ear and L ear    Procedure type: curette      Procedure outcomes: cerumen removed    Post-procedure details:     Inspection:  TM intact and some cerumen remaining    Procedure completion:  Tolerated well, no immediate complications  Comments:      using bionix disposable ear curettes #6333  the cerumen was carefully removed opening a partially/fully occluded ear canalS...it  was/was not necessary to irrigate the ear canalS at time of procedure.... patient tolerated /found painful procedure... there was   NEARLY clear ear canal at end of procedure and instructions were given   Subjective   Patient ID: Nichelle Stahl is a 63 y.o. female who presents for Ear Fullness.       Ear Fullness   There is pain in both ears.   Ears  with itching  .. Off  and on for 4-5  days  Blocked ??    Also  ?  Parkinsons    went to    pt and told she had parkinson...  at  [facility]  Going to pt  for shoulder and  hand.. per dr jacob    Patient is also here for follow-up of hyperlipidemia. The most recent measurements for this patient would take it on.. 1/16/24 At that time.. Total cholesterol..246.. HDL 58      triglycerides  248     .... Associated symptoms do not include chest pain, Cramps with exertion, myalgias or nausea. Current treatment includes statins. By report there is good compliance with treatment. Pertinent medical history includes diabetes and hypertension   Patient is also here for follow-up of hypertension.. Symptoms do not include headache confusion visual  disturbance dizziness shortness of breath chest pain syncope or palpitations. Recent blood pressure patient has not been checking. By report there is good compliance with treatment. Pertinent medical history includes diabetes/hyperlipidemia   Review of Systems  All other  pertinent  systems reviewed and are negative except  those  mentioned  in HPI   FALLS... MOVING SLOWLY  .. SHUFFLING  FEET        LOSS  OF  HAND FUNCTION      SLOW  BLINK  Objective   Physical Exam  Vitals: I have reviewed the vitals  General: Well-developed.  In no acute distress.  Eyes:   sclera nonicteric.  Conjunctiva not injected.  No discharge.   HEAD: Normocephalic, atraumatic.  HEENT   Mucous membranes moist.  Ears with impacted cerumen posterior oropharynx nonerythematous, no tonsillar exudates.      No cervical lymphadenopathy.  Cardio: Regular rate and rhythm.  No murmur, rub or gallop.  Pulmonary: Lungs clear to auscultation in all fields.  No accessory muscle use.  GI/: Normal active bowel sounds.  Soft, nontender.  No masses or organomegaly appreciated.  Musculoskeletal: No gross deformities appreciated.  Neuro: Alert, age-appropriate.  Normal muscle tone.  Moving all extremities.  Skin: No rash, bruises or lesions.   Labs        Assessment/Plan   Problem List Items Addressed This Visit       Hypertension - Primary     Patient is also here for follow-up of hypertension.. Symptoms do not include headache confusion visual disturbance dizziness shortness of breath chest pain syncope or palpitations. Recent blood pressure patient has not been checking. By report there is good compliance with treatment. Pertinent medical history includes diabetes/hyperlipidemia.... Uncontrolled and amlodipine         Relevant Medications    amLODIPine (Norvasc) 2.5 mg tablet    Hyperlipidemia     Patient is also here for follow-up of hyperlipidemia. The most recent measurements for this patient would take it on.. 1/16/24 At that time.. Total  cholesterol..246.. HDL 58      triglycerides  248     .... Associated symptoms do not include chest pain, Cramps with exertion, myalgias or nausea. Current treatment includes statins. By report there is good compliance with treatment. Pertinent medical history includes diabetes and hypertension reviewed lipid numbers and somewhat elevated i.e.  will get repeat and patient encouraged to get done using previous lab order.         Movement disorder     Discussed I am uncertain if she has Parkinson's disease or other abnormality.  I.e. we reviewed the differential diagnosis and number different degenerative diseases causing similar symptoms and in light of side effects of meds with before diagnoses to neurology will refer to neurology and in addition order MRI of brain if he has not been done.         Relevant Orders    Referral to Neurology    MR brain wo IV contrast    Bilateral impacted cerumen     Ear irrigation performed erythema of external auditory canal noted will use Garamycin drops with intent to resolve and advised to use acetic acid drops after showering daily         Relevant Medications    gentamicin (Garamycin) 0.3 % ophthalmic solution    acetic acid (Vosol) 2 % otic solution

## 2024-07-24 NOTE — ASSESSMENT & PLAN NOTE
Discussed I am uncertain if she has Parkinson's disease or other abnormality.  I.e. we reviewed the differential diagnosis and number different degenerative diseases causing similar symptoms and in light of side effects of meds with before diagnoses to neurology will refer to neurology and in addition order MRI of brain if he has not been done.

## 2024-07-24 NOTE — PATIENT INSTRUCTIONS
Please consider exercise program involving walking or some other form of aerobic activity 5 days weekly for 30 minutes... Let's also consider strengthening of large muscle groups like the abdominal muscles or shoulder muscles... Twice weekly with reps of 5/10/15 exercises and gradually increase strength.. This is not heavy strength training but light weight training... Sit ups or back exercise routine.. Please ask for handout if uncertain how to do..This  will help to strengthen your muscles which in turn will help you to lose weight.... You might ask what is the best diet available.. I would strongly encourage you to consider  Weight Watchers.. And as  your  fellow on  Weight Watchers physician attempting to  live this  LIFE  style  choice with you....  I will be glad to give you recommendations on what to eat.. Consider buying Sofia bread.., rambo bagle thin bread.. oikos yogurt... eggs  to eat as hard-boiled... Halo top ice cream for snack... All these are delicious foods which.. when eaten and  being compliant eating three  meals daily  breakfast lunch and dinner, drinking  64 ounces of water daily we will all win together !!!!!!!. This will be a means for you to lose weight... Consider also the smart phone vonnie ... My plate.. Or My  fitness  pal..,  as additional possibilities for weight loss... Good  lucsudheer Morgan!    Discussed medication side effects.  The  risk benefits and treatment options  discussed with patient.       Please schedule follow-up appointment based upon your improvement/failure to improve/chronic medical conditions and physician recommendations during office appointment at the .       Patient advised to go to er if symptoms worsen or to call answering service, or to return to office for additional evaluation    This note was partially  generated using Dragon voice recognition and there may be incorrect words, wording, spelling, or pronunciation errors that were not  corrected prior to committing the note to the medical record.   Problem List Items Addressed This Visit       Hypertension - Primary     Patient is also here for follow-up of hypertension.. Symptoms do not include headache confusion visual disturbance dizziness shortness of breath chest pain syncope or palpitations. Recent blood pressure patient has not been checking. By report there is good compliance with treatment. Pertinent medical history includes diabetes/hyperlipidemia.... Uncontrolled and amlodipine         Relevant Medications    amLODIPine (Norvasc) 2.5 mg tablet    Hyperlipidemia     Patient is also here for follow-up of hyperlipidemia. The most recent measurements for this patient would take it on.. 1/16/24 At that time.. Total cholesterol..246.. HDL 58      triglycerides  248     .... Associated symptoms do not include chest pain, Cramps with exertion, myalgias or nausea. Current treatment includes statins. By report there is good compliance with treatment. Pertinent medical history includes diabetes and hypertension reviewed lipid numbers and somewhat elevated i.e.  will get repeat and patient encouraged to get done using previous lab order.         Movement disorder     Discussed I am uncertain if she has Parkinson's disease or other abnormality.  I.e. we reviewed the differential diagnosis and number different degenerative diseases causing similar symptoms and in light of side effects of meds with before diagnoses to neurology will refer to neurology and in addition order MRI of brain if he has not been done.         Relevant Orders    Referral to Neurology    MR brain wo IV contrast    Bilateral impacted cerumen     Ear irrigation performed erythema of external auditory canal noted will use Garamycin drops with intent to resolve and advised to use acetic acid drops after showering daily         Relevant Medications    gentamicin (Garamycin) 0.3 % ophthalmic solution    acetic acid  (Vosol) 2 % otic solution

## 2024-07-25 ENCOUNTER — HOSPITAL ENCOUNTER (OUTPATIENT)
Dept: RADIOLOGY | Facility: HOSPITAL | Age: 64
Discharge: HOME | End: 2024-07-25
Payer: COMMERCIAL

## 2024-07-25 DIAGNOSIS — G25.9 MOVEMENT DISORDER: ICD-10-CM

## 2024-07-25 PROCEDURE — 70551 MRI BRAIN STEM W/O DYE: CPT

## 2024-07-26 ENCOUNTER — TELEPHONE (OUTPATIENT)
Dept: PRIMARY CARE | Facility: CLINIC | Age: 64
End: 2024-07-26
Payer: COMMERCIAL

## 2024-07-26 DIAGNOSIS — G25.9 MOVEMENT DISORDER: Primary | ICD-10-CM

## 2024-07-26 NOTE — TELEPHONE ENCOUNTER
Called pt 07/26/2024 @ 1436  N/A lvm with office number to call back     Soonest with  Neurology is in [place] on Nov 5th  Soonest appt any closer is [place] on Nov 25th      Can give pt information for Dr. Robert if preferred

## 2024-07-26 NOTE — TELEPHONE ENCOUNTER
Spoke with pt 07/26/2024 @ 0206    Pt declined both appt with .   Gave pt Dr. Robert phone number to see if they are scheduling any sooner

## 2024-08-12 ENCOUNTER — APPOINTMENT (OUTPATIENT)
Dept: ORTHOPEDIC SURGERY | Facility: CLINIC | Age: 64
End: 2024-08-12
Payer: COMMERCIAL

## 2024-08-21 ENCOUNTER — TELEPHONE (OUTPATIENT)
Dept: PRIMARY CARE | Facility: CLINIC | Age: 64
End: 2024-08-21
Payer: COMMERCIAL

## 2024-08-21 DIAGNOSIS — K21.00 GASTROESOPHAGEAL REFLUX DISEASE WITH ESOPHAGITIS, UNSPECIFIED WHETHER HEMORRHAGE: ICD-10-CM

## 2024-08-21 DIAGNOSIS — K21.00 GASTROESOPHAGEAL REFLUX DISEASE WITH ESOPHAGITIS, UNSPECIFIED WHETHER HEMORRHAGE: Primary | ICD-10-CM

## 2024-08-21 RX ORDER — OMEPRAZOLE 20 MG/1
20 CAPSULE, DELAYED RELEASE ORAL DAILY
Qty: 90 CAPSULE | Refills: 1 | Status: CANCELLED | OUTPATIENT
Start: 2024-08-21

## 2024-08-21 RX ORDER — OMEPRAZOLE 20 MG/1
20 CAPSULE, DELAYED RELEASE ORAL DAILY
Qty: 90 CAPSULE | Refills: 2 | Status: SHIPPED | OUTPATIENT
Start: 2024-08-21

## 2024-08-21 NOTE — TELEPHONE ENCOUNTER
Pt has been taking Omeprazole OTC for years and is requesting you send in Rx for this   Please Advise

## 2024-09-11 ENCOUNTER — OFFICE VISIT (OUTPATIENT)
Dept: URGENT CARE | Age: 64
End: 2024-09-11
Payer: COMMERCIAL

## 2024-09-11 VITALS
HEIGHT: 67 IN | TEMPERATURE: 98.3 F | SYSTOLIC BLOOD PRESSURE: 143 MMHG | HEART RATE: 72 BPM | WEIGHT: 235 LBS | BODY MASS INDEX: 36.88 KG/M2 | RESPIRATION RATE: 16 BRPM | DIASTOLIC BLOOD PRESSURE: 82 MMHG | OXYGEN SATURATION: 99 %

## 2024-09-11 DIAGNOSIS — N39.0 URINARY TRACT INFECTION WITHOUT HEMATURIA, SITE UNSPECIFIED: Primary | ICD-10-CM

## 2024-09-11 DIAGNOSIS — R30.0 DYSURIA: ICD-10-CM

## 2024-09-11 LAB
POC APPEARANCE, URINE: ABNORMAL
POC BILIRUBIN, URINE: NEGATIVE
POC BLOOD, URINE: NEGATIVE
POC COLOR, URINE: ABNORMAL
POC GLUCOSE, URINE: NEGATIVE MG/DL
POC KETONES, URINE: NEGATIVE MG/DL
POC LEUKOCYTES, URINE: ABNORMAL
POC NITRITE,URINE: NEGATIVE
POC PH, URINE: 6 PH
POC PROTEIN, URINE: NEGATIVE MG/DL
POC SPECIFIC GRAVITY, URINE: >=1.03
POC UROBILINOGEN, URINE: 0.2 EU/DL

## 2024-09-11 PROCEDURE — 87086 URINE CULTURE/COLONY COUNT: CPT

## 2024-09-11 RX ORDER — SULFAMETHOXAZOLE AND TRIMETHOPRIM 800; 160 MG/1; MG/1
1 TABLET ORAL 2 TIMES DAILY
Qty: 14 TABLET | Refills: 0 | Status: SHIPPED | OUTPATIENT
Start: 2024-09-11 | End: 2024-09-18

## 2024-09-11 NOTE — PROGRESS NOTES
" Subjective   Patient ID: Nichelle Stahl is a 63 y.o. female who presents for Difficulty Urinating (Frequency,urgency /Started 6 days ago ).  HPI  Female presents for evaluation of dysuria. Patient reports several days of increased urinary frequency, mild suprapubic discomfort, and change in urine color. Patient denies fever, nausea, vomiting, or other constitutional signs and symptoms. Patient reports similar episodes in the past diagnosed as urinary tract infections. No other complaints.    Review of Systems    Constitutional:  See HPI   Gastrointestinal: See HPI  Genitourinary: See HPI  Neurologic:  Alert and oriented X4, No numbness, No tingling.    All other systems are negative     Objective     /82 (BP Location: Right arm, Patient Position: Sitting, BP Cuff Size: Adult)   Pulse 72   Temp 36.8 °C (98.3 °F)   Resp 16   Ht 1.702 m (5' 7\")   Wt 107 kg (235 lb)   LMP  (LMP Unknown)   SpO2 99%   BMI 36.81 kg/m²     Physical Exam    General:  Alert and oriented, No acute distress.    Eye:  Pupils are equal, round and reactive to light, Normal conjunctiva.    HENT:  Normocephalic,   Neck:  Supple    Respiratory: Respirations are non-labored   Musculoskeletal: Normal ROM and strength  Integumentary:  Warm, Dry, Intact, No pallor, No rash.    Neurologic:  Alert, Oriented, Normal sensory, Cranial Nerves II-XII are grossly intact  Psychiatric:  Cooperative, Appropriate mood & affect.    Assessment/Plan   Urinalysis with leukocyte esterase.  History is consistent with UTI.  Prescription for Bactrim.  Urine sent for culture.  Patient's clinical presentation is otherwise unremarkable at this time. Patient is discharged with instructions to follow-up with primary care or seek emergency medical attention for worsening symptoms or any new concerns.    Problem List Items Addressed This Visit    None  Visit Diagnoses       Urinary tract infection without hematuria, site unspecified    -  Primary    Relevant " Medications    sulfamethoxazole-trimethoprim (Bactrim DS) 800-160 mg tablet    Other Relevant Orders    Urine Culture    Dysuria        Relevant Medications    sulfamethoxazole-trimethoprim (Bactrim DS) 800-160 mg tablet    Other Relevant Orders    POCT UA Automated manually resulted (Completed)    Urine Culture            Final diagnoses:   [R30.0] Dysuria   [N39.0] Urinary tract infection without hematuria, site unspecified

## 2024-09-12 ENCOUNTER — OFFICE VISIT (OUTPATIENT)
Dept: ORTHOPEDIC SURGERY | Facility: CLINIC | Age: 64
End: 2024-09-12
Payer: COMMERCIAL

## 2024-09-12 DIAGNOSIS — G56.01 CARPAL TUNNEL SYNDROME OF RIGHT WRIST: Primary | ICD-10-CM

## 2024-09-12 PROCEDURE — 1036F TOBACCO NON-USER: CPT | Performed by: STUDENT IN AN ORGANIZED HEALTH CARE EDUCATION/TRAINING PROGRAM

## 2024-09-12 PROCEDURE — 99214 OFFICE O/P EST MOD 30 MIN: CPT | Performed by: STUDENT IN AN ORGANIZED HEALTH CARE EDUCATION/TRAINING PROGRAM

## 2024-09-12 PROCEDURE — 4010F ACE/ARB THERAPY RXD/TAKEN: CPT | Performed by: STUDENT IN AN ORGANIZED HEALTH CARE EDUCATION/TRAINING PROGRAM

## 2024-09-12 PROCEDURE — 3050F LDL-C >= 130 MG/DL: CPT | Performed by: STUDENT IN AN ORGANIZED HEALTH CARE EDUCATION/TRAINING PROGRAM

## 2024-09-12 PROCEDURE — 3061F NEG MICROALBUMINURIA REV: CPT | Performed by: STUDENT IN AN ORGANIZED HEALTH CARE EDUCATION/TRAINING PROGRAM

## 2024-09-12 NOTE — PROGRESS NOTES
History of Present Illness  Patient returns today for evaluation of right hand.  Had EMG in February.  This did show moderate carpal tunnel.  She has been occupational therapy and has had some improvement in her hand range of motion but continues to have weakness numbness and tingling..     Injection 7/8 that improved sxs for a few weeks. Recently diagnosed with parkinsons    Physical Examination:  Right upper extremity:  The patient appears to be their stated age, is in no apparent distress, and is oriented x3. The patients mood and affect are appropriate. The patients gait is normal. The examination of the limb in question was performed in comparison to the contralateral limb.    On musculoskeletal examination, positive Phalen Durkan at the carpal tunnel    Sensation and motor function are intact in the radial, and median nerve distribution. There is no obvious thenar atrophy, and thenar strength is 5/5. There is no intrinsic atrophy, and intrinsic strength is 5/5.  The patient can make a full composite fist. The hand itself is warm and well perfused. The skin is intact throughout. The contralateral hand/wrist are normal to inspection, range of motion, stability, and strength.      Assessment:  Patient with right carpal tunnel. 2 week improvement with injection. Would like to proceed with CTR.    Plan:   Given the duration of symptoms and the failure of non-operative treatment, I have recommended decompression. The benefit of surgery would be to stop the progression of symptoms, with the hope that the symptoms would also resolve. The risks of surgery include, but are not limited to, infection, bleeding/hematoma, nerve/vessel injury, wound dehisence, persistent symptoms, and anesthetic complications. The patient understood the risks/benefits and consented to surgery. I will schedule them in the near future. I have answered all questions regarding the surgery itself and the post-operative course.        Audelia  Eddie VALENTINO

## 2024-09-13 LAB — BACTERIA UR CULT: ABNORMAL

## 2024-09-14 ENCOUNTER — TELEPHONE (OUTPATIENT)
Dept: URGENT CARE | Age: 64
End: 2024-09-14

## 2024-09-14 NOTE — TELEPHONE ENCOUNTER
Patient called to give results of contaminated urine culture. Patient stated that she feels a lot better and will continue to take prescribed antibiotic. Patient will follow up with PCP if need.

## 2024-09-16 ENCOUNTER — TRANSCRIBE ORDERS (OUTPATIENT)
Dept: ORTHOPEDIC SURGERY | Facility: CLINIC | Age: 64
End: 2024-09-16
Payer: COMMERCIAL

## 2024-09-16 DIAGNOSIS — G56.01 CARPAL TUNNEL SYNDROME, RIGHT UPPER LIMB: Primary | ICD-10-CM

## 2024-09-20 PROBLEM — G56.01 CARPAL TUNNEL SYNDROME, RIGHT UPPER LIMB: Status: ACTIVE | Noted: 2024-09-16

## 2024-10-17 ENCOUNTER — APPOINTMENT (OUTPATIENT)
Dept: ORTHOPEDIC SURGERY | Facility: CLINIC | Age: 64
End: 2024-10-17
Payer: COMMERCIAL

## 2024-10-24 RX ORDER — BISMUTH SUBSALICYLATE 262 MG
1 TABLET,CHEWABLE ORAL DAILY
COMMUNITY

## 2024-10-24 RX ORDER — CARBIDOPA AND LEVODOPA 25; 100 MG/1; MG/1
1 TABLET ORAL 3 TIMES DAILY
COMMUNITY
Start: 2024-09-09

## 2024-10-24 RX ORDER — IBUPROFEN 200 MG
1 TABLET ORAL DAILY
COMMUNITY

## 2024-10-24 RX ORDER — ASCORBIC ACID 1000 MG
1 TABLET ORAL DAILY
COMMUNITY

## 2024-10-24 RX ORDER — NAPROXEN SODIUM 220 MG/1
1 TABLET ORAL DAILY
COMMUNITY

## 2024-10-24 RX ORDER — CALCIUM CARB, CITRATE, MALATE 250 MG
1 CAPSULE ORAL DAILY
COMMUNITY

## 2024-10-31 ENCOUNTER — HOSPITAL ENCOUNTER (OUTPATIENT)
Facility: HOSPITAL | Age: 64
Setting detail: OUTPATIENT SURGERY
Discharge: HOME | End: 2024-10-31
Attending: STUDENT IN AN ORGANIZED HEALTH CARE EDUCATION/TRAINING PROGRAM | Admitting: STUDENT IN AN ORGANIZED HEALTH CARE EDUCATION/TRAINING PROGRAM
Payer: COMMERCIAL

## 2024-10-31 VITALS
TEMPERATURE: 97.9 F | HEART RATE: 78 BPM | BODY MASS INDEX: 36.64 KG/M2 | HEIGHT: 67 IN | SYSTOLIC BLOOD PRESSURE: 145 MMHG | OXYGEN SATURATION: 96 % | RESPIRATION RATE: 18 BRPM | DIASTOLIC BLOOD PRESSURE: 68 MMHG | WEIGHT: 233.47 LBS

## 2024-10-31 DIAGNOSIS — G56.01 CARPAL TUNNEL SYNDROME, RIGHT UPPER LIMB: Primary | ICD-10-CM

## 2024-10-31 PROCEDURE — 64721 CARPAL TUNNEL SURGERY: CPT | Performed by: STUDENT IN AN ORGANIZED HEALTH CARE EDUCATION/TRAINING PROGRAM

## 2024-10-31 PROCEDURE — 3600000008 HC OR TIME - EACH INCREMENTAL 1 MINUTE - PROCEDURE LEVEL THREE: Performed by: STUDENT IN AN ORGANIZED HEALTH CARE EDUCATION/TRAINING PROGRAM

## 2024-10-31 PROCEDURE — 7100000010 HC PHASE TWO TIME - EACH INCREMENTAL 1 MINUTE: Performed by: STUDENT IN AN ORGANIZED HEALTH CARE EDUCATION/TRAINING PROGRAM

## 2024-10-31 PROCEDURE — 3600000003 HC OR TIME - INITIAL BASE CHARGE - PROCEDURE LEVEL THREE: Performed by: STUDENT IN AN ORGANIZED HEALTH CARE EDUCATION/TRAINING PROGRAM

## 2024-10-31 PROCEDURE — 2500000005 HC RX 250 GENERAL PHARMACY W/O HCPCS: Performed by: STUDENT IN AN ORGANIZED HEALTH CARE EDUCATION/TRAINING PROGRAM

## 2024-10-31 PROCEDURE — 7100000009 HC PHASE TWO TIME - INITIAL BASE CHARGE: Performed by: STUDENT IN AN ORGANIZED HEALTH CARE EDUCATION/TRAINING PROGRAM

## 2024-10-31 PROCEDURE — 2500000004 HC RX 250 GENERAL PHARMACY W/ HCPCS (ALT 636 FOR OP/ED): Performed by: STUDENT IN AN ORGANIZED HEALTH CARE EDUCATION/TRAINING PROGRAM

## 2024-10-31 RX ORDER — SODIUM CHLORIDE 0.9 G/100ML
IRRIGANT IRRIGATION AS NEEDED
Status: DISCONTINUED | OUTPATIENT
Start: 2024-10-31 | End: 2024-10-31 | Stop reason: HOSPADM

## 2024-10-31 RX ORDER — IBUPROFEN 800 MG/1
800 TABLET ORAL 3 TIMES DAILY
Qty: 15 TABLET | Refills: 0 | Status: SHIPPED | OUTPATIENT
Start: 2024-10-31 | End: 2024-11-05

## 2024-10-31 RX ORDER — LIDOCAINE HYDROCHLORIDE AND EPINEPHRINE 10; 10 UG/ML; MG/ML
20 INJECTION, SOLUTION INFILTRATION; PERINEURAL ONCE
Status: COMPLETED | OUTPATIENT
Start: 2024-10-31 | End: 2024-10-31

## 2024-10-31 RX ORDER — TRAMADOL HYDROCHLORIDE 50 MG/1
50 TABLET ORAL EVERY 8 HOURS PRN
Qty: 5 TABLET | Refills: 0 | Status: SHIPPED | OUTPATIENT
Start: 2024-10-31 | End: 2024-11-03

## 2024-10-31 ASSESSMENT — PAIN SCALES - GENERAL
PAINLEVEL_OUTOF10: 0 - NO PAIN
PAINLEVEL_OUTOF10: 0 - NO PAIN

## 2024-10-31 ASSESSMENT — PAIN - FUNCTIONAL ASSESSMENT
PAIN_FUNCTIONAL_ASSESSMENT: 0-10
PAIN_FUNCTIONAL_ASSESSMENT: 0-10

## 2024-10-31 ASSESSMENT — COLUMBIA-SUICIDE SEVERITY RATING SCALE - C-SSRS
2. HAVE YOU ACTUALLY HAD ANY THOUGHTS OF KILLING YOURSELF?: NO
1. IN THE PAST MONTH, HAVE YOU WISHED YOU WERE DEAD OR WISHED YOU COULD GO TO SLEEP AND NOT WAKE UP?: NO
6. HAVE YOU EVER DONE ANYTHING, STARTED TO DO ANYTHING, OR PREPARED TO DO ANYTHING TO END YOUR LIFE?: NO

## 2024-11-14 ENCOUNTER — APPOINTMENT (OUTPATIENT)
Dept: ORTHOPEDIC SURGERY | Facility: CLINIC | Age: 64
End: 2024-11-14
Payer: COMMERCIAL

## 2024-11-14 DIAGNOSIS — G56.01 CARPAL TUNNEL SYNDROME OF RIGHT WRIST: Primary | ICD-10-CM

## 2024-11-14 PROCEDURE — 4010F ACE/ARB THERAPY RXD/TAKEN: CPT | Performed by: STUDENT IN AN ORGANIZED HEALTH CARE EDUCATION/TRAINING PROGRAM

## 2024-11-14 PROCEDURE — 3061F NEG MICROALBUMINURIA REV: CPT | Performed by: STUDENT IN AN ORGANIZED HEALTH CARE EDUCATION/TRAINING PROGRAM

## 2024-11-14 PROCEDURE — 99024 POSTOP FOLLOW-UP VISIT: CPT | Performed by: STUDENT IN AN ORGANIZED HEALTH CARE EDUCATION/TRAINING PROGRAM

## 2024-11-14 PROCEDURE — 3050F LDL-C >= 130 MG/DL: CPT | Performed by: STUDENT IN AN ORGANIZED HEALTH CARE EDUCATION/TRAINING PROGRAM

## 2024-11-14 NOTE — PROGRESS NOTES
2 weeks S/p RCTR. Doing well. Improvement in preoperative numbness and tingling.      Physical Examination:  The patient appears to be their stated age, is in no apparent distress, and is oriented x3. The patients mood and affect are appropriate. The patients gait is normal. The examination of the limb in question was performed in comparison to the contralateral limb.    Incision c/d/I  AIN, PIN, ulnar intact  SILT A/R/U/M  Hand wwp    Assessment:  2 weeks post op    Plan:   The patient will progress to weightbearing to tolerance with theupper extremity.  We reviewed range of motion recovery exercises to the digits and wrist, scar massage and desensitization techniques.  The patient will work on these on her own with home exercise program.  Follow up with our office on an as-needed basis.  We did offer a formal therapy referral.  They declined in favor of home exercise program.   The patient verbalized agreement and understanding of plan for care.  All questions were answered at today's visit.          Audelia Morgan MD

## 2024-11-17 DIAGNOSIS — I10 HYPERTENSION, UNSPECIFIED TYPE: ICD-10-CM

## 2024-11-18 RX ORDER — AMLODIPINE BESYLATE 2.5 MG/1
2.5 TABLET ORAL DAILY
Qty: 90 TABLET | Refills: 1 | Status: SHIPPED | OUTPATIENT
Start: 2024-11-18

## 2024-12-03 DIAGNOSIS — I10 HYPERTENSION, UNSPECIFIED TYPE: ICD-10-CM

## 2024-12-03 RX ORDER — LISINOPRIL 20 MG/1
20 TABLET ORAL DAILY
Qty: 90 TABLET | Refills: 1 | Status: SHIPPED | OUTPATIENT
Start: 2024-12-03

## 2024-12-05 DIAGNOSIS — K21.00 GASTROESOPHAGEAL REFLUX DISEASE WITH ESOPHAGITIS, UNSPECIFIED WHETHER HEMORRHAGE: ICD-10-CM

## 2024-12-05 RX ORDER — LANSOPRAZOLE 30 MG/1
30 CAPSULE, DELAYED RELEASE ORAL
Qty: 90 CAPSULE | Refills: 2 | Status: SHIPPED | OUTPATIENT
Start: 2024-12-05

## 2024-12-09 ENCOUNTER — APPOINTMENT (OUTPATIENT)
Dept: PRIMARY CARE | Facility: CLINIC | Age: 64
End: 2024-12-09
Payer: COMMERCIAL

## 2024-12-09 ENCOUNTER — LAB (OUTPATIENT)
Dept: LAB | Facility: LAB | Age: 64
End: 2024-12-09
Payer: COMMERCIAL

## 2024-12-09 VITALS
HEART RATE: 79 BPM | BODY MASS INDEX: 36.88 KG/M2 | OXYGEN SATURATION: 98 % | DIASTOLIC BLOOD PRESSURE: 80 MMHG | WEIGHT: 235 LBS | HEIGHT: 67 IN | TEMPERATURE: 97 F | RESPIRATION RATE: 18 BRPM | SYSTOLIC BLOOD PRESSURE: 138 MMHG

## 2024-12-09 DIAGNOSIS — K21.00 GASTROESOPHAGEAL REFLUX DISEASE WITH ESOPHAGITIS, UNSPECIFIED WHETHER HEMORRHAGE: ICD-10-CM

## 2024-12-09 DIAGNOSIS — E78.5 HYPERLIPIDEMIA, UNSPECIFIED HYPERLIPIDEMIA TYPE: ICD-10-CM

## 2024-12-09 DIAGNOSIS — I10 HYPERTENSION, UNSPECIFIED TYPE: ICD-10-CM

## 2024-12-09 DIAGNOSIS — J30.5 ALLERGIC RHINITIS DUE TO FOOD: ICD-10-CM

## 2024-12-09 DIAGNOSIS — E11.69 TYPE 2 DIABETES MELLITUS WITH OTHER SPECIFIED COMPLICATION, WITHOUT LONG-TERM CURRENT USE OF INSULIN: ICD-10-CM

## 2024-12-09 DIAGNOSIS — E11.9 TYPE 2 DIABETES MELLITUS WITHOUT COMPLICATION, UNSPECIFIED WHETHER LONG TERM INSULIN USE (MULTI): ICD-10-CM

## 2024-12-09 DIAGNOSIS — E55.9 VITAMIN D DEFICIENCY: ICD-10-CM

## 2024-12-09 DIAGNOSIS — H83.3X3 NOISE-INDUCED HEARING LOSS OF BOTH EARS: Primary | ICD-10-CM

## 2024-12-09 DIAGNOSIS — E04.1 THYROID NODULE: ICD-10-CM

## 2024-12-09 LAB
25(OH)D3 SERPL-MCNC: 19 NG/ML (ref 30–100)
ALBUMIN SERPL BCP-MCNC: 4.4 G/DL (ref 3.4–5)
ALP SERPL-CCNC: 84 U/L (ref 33–136)
ALT SERPL W P-5'-P-CCNC: 8 U/L (ref 7–45)
ANION GAP SERPL CALC-SCNC: 13 MMOL/L (ref 10–20)
AST SERPL W P-5'-P-CCNC: 16 U/L (ref 9–39)
BASOPHILS # BLD AUTO: 0.08 X10*3/UL (ref 0–0.1)
BASOPHILS NFR BLD AUTO: 1.2 %
BILIRUB SERPL-MCNC: 0.6 MG/DL (ref 0–1.2)
BUN SERPL-MCNC: 22 MG/DL (ref 6–23)
CALCIUM SERPL-MCNC: 9.2 MG/DL (ref 8.6–10.3)
CHLORIDE SERPL-SCNC: 103 MMOL/L (ref 98–107)
CHOLEST SERPL-MCNC: 259 MG/DL (ref 0–199)
CHOLESTEROL/HDL RATIO: 4.2
CO2 SERPL-SCNC: 26 MMOL/L (ref 21–32)
CREAT SERPL-MCNC: 0.82 MG/DL (ref 0.5–1.05)
EGFRCR SERPLBLD CKD-EPI 2021: 80 ML/MIN/1.73M*2
EOSINOPHIL # BLD AUTO: 0.14 X10*3/UL (ref 0–0.7)
EOSINOPHIL NFR BLD AUTO: 2.1 %
ERYTHROCYTE [DISTWIDTH] IN BLOOD BY AUTOMATED COUNT: 12.5 % (ref 11.5–14.5)
GLUCOSE SERPL-MCNC: 97 MG/DL (ref 74–99)
HCT VFR BLD AUTO: 43.5 % (ref 36–46)
HDLC SERPL-MCNC: 61.5 MG/DL
HGB BLD-MCNC: 14.7 G/DL (ref 12–16)
IMM GRANULOCYTES # BLD AUTO: 0.03 X10*3/UL (ref 0–0.7)
IMM GRANULOCYTES NFR BLD AUTO: 0.4 % (ref 0–0.9)
LDLC SERPL CALC-MCNC: 151 MG/DL
LYMPHOCYTES # BLD AUTO: 1.66 X10*3/UL (ref 1.2–4.8)
LYMPHOCYTES NFR BLD AUTO: 24.5 %
MAGNESIUM SERPL-MCNC: 2.08 MG/DL (ref 1.6–2.4)
MCH RBC QN AUTO: 30.4 PG (ref 26–34)
MCHC RBC AUTO-ENTMCNC: 33.8 G/DL (ref 32–36)
MCV RBC AUTO: 90 FL (ref 80–100)
MONOCYTES # BLD AUTO: 0.41 X10*3/UL (ref 0.1–1)
MONOCYTES NFR BLD AUTO: 6.1 %
NEUTROPHILS # BLD AUTO: 4.45 X10*3/UL (ref 1.2–7.7)
NEUTROPHILS NFR BLD AUTO: 65.7 %
NON HDL CHOLESTEROL: 198 MG/DL (ref 0–149)
NRBC BLD-RTO: 0 /100 WBCS (ref 0–0)
PLATELET # BLD AUTO: 218 X10*3/UL (ref 150–450)
POC HEMOGLOBIN A1C: 6.4 % (ref 4.2–6.5)
POTASSIUM SERPL-SCNC: 4.6 MMOL/L (ref 3.5–5.3)
PROT SERPL-MCNC: 6.8 G/DL (ref 6.4–8.2)
RBC # BLD AUTO: 4.83 X10*6/UL (ref 4–5.2)
SODIUM SERPL-SCNC: 137 MMOL/L (ref 136–145)
T4 FREE SERPL-MCNC: 0.9 NG/DL (ref 0.61–1.12)
TRIGL SERPL-MCNC: 231 MG/DL (ref 0–149)
TSH SERPL-ACNC: 1.02 MIU/L (ref 0.44–3.98)
VIT B12 SERPL-MCNC: 387 PG/ML (ref 211–911)
VLDL: 46 MG/DL (ref 0–40)
WBC # BLD AUTO: 6.8 X10*3/UL (ref 4.4–11.3)

## 2024-12-09 PROCEDURE — 99214 OFFICE O/P EST MOD 30 MIN: CPT | Performed by: FAMILY MEDICINE

## 2024-12-09 PROCEDURE — 84439 ASSAY OF FREE THYROXINE: CPT

## 2024-12-09 PROCEDURE — 82607 VITAMIN B-12: CPT

## 2024-12-09 PROCEDURE — 80053 COMPREHEN METABOLIC PANEL: CPT

## 2024-12-09 PROCEDURE — 3079F DIAST BP 80-89 MM HG: CPT | Performed by: FAMILY MEDICINE

## 2024-12-09 PROCEDURE — 3008F BODY MASS INDEX DOCD: CPT | Performed by: FAMILY MEDICINE

## 2024-12-09 PROCEDURE — 4010F ACE/ARB THERAPY RXD/TAKEN: CPT | Performed by: FAMILY MEDICINE

## 2024-12-09 PROCEDURE — 85025 COMPLETE CBC W/AUTO DIFF WBC: CPT

## 2024-12-09 PROCEDURE — 83036 HEMOGLOBIN GLYCOSYLATED A1C: CPT | Performed by: FAMILY MEDICINE

## 2024-12-09 PROCEDURE — 83735 ASSAY OF MAGNESIUM: CPT

## 2024-12-09 PROCEDURE — 36415 COLL VENOUS BLD VENIPUNCTURE: CPT

## 2024-12-09 PROCEDURE — 3061F NEG MICROALBUMINURIA REV: CPT | Performed by: FAMILY MEDICINE

## 2024-12-09 PROCEDURE — 3075F SYST BP GE 130 - 139MM HG: CPT | Performed by: FAMILY MEDICINE

## 2024-12-09 PROCEDURE — 80061 LIPID PANEL: CPT

## 2024-12-09 PROCEDURE — 3050F LDL-C >= 130 MG/DL: CPT | Performed by: FAMILY MEDICINE

## 2024-12-09 PROCEDURE — 84443 ASSAY THYROID STIM HORMONE: CPT

## 2024-12-09 PROCEDURE — 82306 VITAMIN D 25 HYDROXY: CPT

## 2024-12-09 PROCEDURE — 1036F TOBACCO NON-USER: CPT | Performed by: FAMILY MEDICINE

## 2024-12-09 RX ORDER — LEVOCETIRIZINE DIHYDROCHLORIDE 5 MG/1
5 TABLET, FILM COATED ORAL EVERY EVENING
Qty: 90 TABLET | Refills: 3 | Status: SHIPPED | OUTPATIENT
Start: 2024-12-09 | End: 2025-12-09

## 2024-12-09 RX ORDER — AMLODIPINE BESYLATE 2.5 MG/1
2.5 TABLET ORAL DAILY
Qty: 90 TABLET | Refills: 1 | Status: SHIPPED | OUTPATIENT
Start: 2024-12-09

## 2024-12-09 NOTE — ASSESSMENT & PLAN NOTE
Patient is here for a recheck of diabetes.hgba1c  was   6.4    checks his blood sugars.  once richard.ly . Highest sugar was  124  low sugar.. 95   remains compliant on his medications.,,, Hypoglycemia has occurred rarely or never.... Symptoms do not include polydipsia, polyuria, blurred vision, numbness and tingling in the toes, increased appetite, weight gain, weight loss, infections or foot problems.. baby aspirin   81mg......denies..    eye exam   one year   stable and continue meds

## 2024-12-09 NOTE — ASSESSMENT & PLAN NOTE
Try nasal toilet flushing out your nose as is allergic to Xyzal 5 mg at bedtime and if fails to help we will try alternative medicine okay

## 2024-12-09 NOTE — PATIENT INSTRUCTIONS

## 2024-12-09 NOTE — PROGRESS NOTES
Subjective   Patient ID: Nichelle Stahl is a 63 y.o. female who presents for Follow-up.  HPI  Patient is also here for follow-up of hypertension.. Symptoms do not include headache confusion visual disturbance dizziness shortness of breath chest pain syncope or palpitations. Recent blood pressure patient has not been checking. By report there is good compliance with treatment. Pertinent medical history includes diabetes/   Patient is here for a recheck of diabetes.hgba1c  was   6.4    checks his blood sugars.  once richard.ly . Highest sugar was  124  low sugar.. 95   remains compliant on his medications.,,, Hypoglycemia has occurred rarely or never.... Symptoms do not include polydipsia, polyuria, blurred vision, numbness and tingling in the toes, increased appetite, weight gain, weight loss, infections or foot problems.. baby aspirin   81mg......denies..    eye exam   one year     The reflux esophagitis he has been occurring in a recurrent pattern for years. The course has been without change. The reflux is described as moderate. The patient remains compliant on meds.. The patient takes medications in a  daily fashion. Denies dysphagia hoarseness or odynophagia...     Chronic rhinorhea     x 8 months         Review of Systems  cardiovascular:  no  palpitations or chest  pain  respiratory: no  shortness  of  breath  endocrine:  no polydipsia,  no polyuria  musculoskeletal:  no  myalgia.. no arthralgia  All other  systems discussed  negative   Objective   Physical Exam  Vitals: I have reviewed the vitals  General: Well-developed.  In no acute distress.  Eyes:   sclera nonicteric.  Conjunctiva not injected.  No discharge.   HEAD: Normocephalic, atraumatic.  HEENT   Mucous membranes moist.  Posterior oropharynx nonerythematous, no tonsillar exudates.      No cervical lymphadenopathy.  Cardio: Regular rate and rhythm.  No murmur, rub or gallop.  Pulmonary: Lungs clear to auscultation in all fields.  No accessory muscle  use.  GI/: Normal active bowel sounds.  Soft, nontender.  No masses or organomegaly appreciated.  Musculoskeletal: No gross deformities appreciated.  Neuro: Alert, age-appropriate.  Normal muscle tone.  Moving all extremities.  Skin: No rash, bruises or lesions.   Labs        Assessment/Plan   Problem List Items Addressed This Visit       Hypertension     Patient is also here for follow-up of hypertension.. Symptoms do not include headache confusion visual disturbance dizziness shortness of breath chest pain syncope or palpitations. Recent blood pressure patient has not been checking. By report there is good compliance with treatment. Pertinent medical history includes diabetes/  stable and continue meds             Relevant Medications    amLODIPine (Norvasc) 2.5 mg tablet    Reflux esophagitis      The reflux esophagitis he has been occurring in a recurrent pattern for years. The course has been without change. The reflux is described as moderate. The patient remains compliant on meds.. The patient takes medications in a  daily fashion. Denies dysphagia hoarseness or odynophagia...   stable and continue meds          Hearing loss - Primary    Relevant Orders    Referral to ENT    Allergic rhinitis     Try nasal toilet flushing out your nose as is allergic to Xyzal 5 mg at bedtime and if fails to help we will try alternative medicine okay         Type 2 diabetes mellitus     Patient is here for a recheck of diabetes.hgba1c  was   6.4    checks his blood sugars.  once richard.ly . Highest sugar was  124  low sugar.. 95   remains compliant on his medications.,,, Hypoglycemia has occurred rarely or never.... Symptoms do not include polydipsia, polyuria, blurred vision, numbness and tingling in the toes, increased appetite, weight gain, weight loss, infections or foot problems.. baby aspirin   81mg......denies..    eye exam   one year   stable and continue meds             Relevant Orders    POCT glycosylated hemoglobin  (Hb A1C) manually resulted (Completed)

## 2024-12-09 NOTE — ASSESSMENT & PLAN NOTE
Patient is also here for follow-up of hypertension.. Symptoms do not include headache confusion visual disturbance dizziness shortness of breath chest pain syncope or palpitations. Recent blood pressure patient has not been checking. By report there is good compliance with treatment. Pertinent medical history includes diabetes/  stable and continue meds

## 2024-12-09 NOTE — ASSESSMENT & PLAN NOTE
The reflux esophagitis he has been occurring in a recurrent pattern for years. The course has been without change. The reflux is described as moderate. The patient remains compliant on meds.. The patient takes medications in a  daily fashion. Denies dysphagia hoarseness or odynophagia...   stable and continue meds

## 2025-03-10 ENCOUNTER — TELEPHONE (OUTPATIENT)
Dept: PRIMARY CARE | Facility: CLINIC | Age: 65
End: 2025-03-10
Payer: COMMERCIAL

## 2025-03-10 DIAGNOSIS — K21.00 GASTROESOPHAGEAL REFLUX DISEASE WITH ESOPHAGITIS WITHOUT HEMORRHAGE: Primary | ICD-10-CM

## 2025-03-10 RX ORDER — OMEPRAZOLE 40 MG/1
40 CAPSULE, DELAYED RELEASE ORAL
Qty: 30 CAPSULE | Refills: 11 | Status: SHIPPED | OUTPATIENT
Start: 2025-03-10 | End: 2025-04-09

## 2025-03-10 NOTE — TELEPHONE ENCOUNTER
Insurance will not cover the Lansoprazole 30mg .   Gwyn asking did you want to change to something else ?  Please Advise

## 2025-03-30 ENCOUNTER — OFFICE VISIT (OUTPATIENT)
Dept: URGENT CARE | Age: 65
End: 2025-03-30
Payer: COMMERCIAL

## 2025-03-30 VITALS
TEMPERATURE: 97.2 F | RESPIRATION RATE: 20 BRPM | SYSTOLIC BLOOD PRESSURE: 118 MMHG | BODY MASS INDEX: 37.04 KG/M2 | HEART RATE: 72 BPM | HEIGHT: 67 IN | WEIGHT: 236 LBS | DIASTOLIC BLOOD PRESSURE: 81 MMHG | OXYGEN SATURATION: 95 %

## 2025-03-30 DIAGNOSIS — R05.9 COUGH, UNSPECIFIED TYPE: ICD-10-CM

## 2025-03-30 DIAGNOSIS — J06.9 VIRAL URI: Primary | ICD-10-CM

## 2025-03-30 LAB
POC HUMAN RHINOVIRUS PCR: POSITIVE
POC INFLUENZA A VIRUS PCR: NEGATIVE
POC INFLUENZA B VIRUS PCR: NEGATIVE
POC RESPIRATORY SYNCYTIAL VIRUS PCR: NEGATIVE
POC STREPTOCOCCUS PYOGENES (GROUP A STREP) PCR: NEGATIVE

## 2025-03-30 PROCEDURE — 3008F BODY MASS INDEX DOCD: CPT | Performed by: FAMILY MEDICINE

## 2025-03-30 PROCEDURE — 3079F DIAST BP 80-89 MM HG: CPT | Performed by: FAMILY MEDICINE

## 2025-03-30 PROCEDURE — 87651 STREP A DNA AMP PROBE: CPT | Performed by: FAMILY MEDICINE

## 2025-03-30 PROCEDURE — 87631 RESP VIRUS 3-5 TARGETS: CPT | Performed by: FAMILY MEDICINE

## 2025-03-30 PROCEDURE — 99213 OFFICE O/P EST LOW 20 MIN: CPT | Performed by: FAMILY MEDICINE

## 2025-03-30 PROCEDURE — 4010F ACE/ARB THERAPY RXD/TAKEN: CPT | Performed by: FAMILY MEDICINE

## 2025-03-30 PROCEDURE — 3074F SYST BP LT 130 MM HG: CPT | Performed by: FAMILY MEDICINE

## 2025-03-30 RX ORDER — FLUTICASONE PROPIONATE 50 MCG
2 SPRAY, SUSPENSION (ML) NASAL DAILY
Qty: 16 G | Refills: 0 | Status: SHIPPED | OUTPATIENT
Start: 2025-03-30 | End: 2026-03-30

## 2025-03-30 RX ORDER — BENZONATATE 200 MG/1
200 CAPSULE ORAL 3 TIMES DAILY PRN
Qty: 30 CAPSULE | Refills: 0 | Status: SHIPPED | OUTPATIENT
Start: 2025-03-30 | End: 2025-04-09

## 2025-03-30 NOTE — PROGRESS NOTES
Subjective   Patient ID: Nichelle Stahl is a 64 y.o. female. They present today with a chief complaint of Sore Throat, Headache, Nasal Congestion, Cough, and Chills (X 2wks).    History of Present Illness  HPI  2 weeks of sore throat.  Complaint of headaches.  Mild nasal congestion with postnasal drip.  No fevers but complaining of chills. No eye redness, discharge or itching. No nausea vomiting or diarrhea. No chest pain, shortness of breath or wheezing noted. No rashes or skin lesions noted. No known exposures to Mono, strep, pneumonia or influenza. Over-the-counter medications taken for symptoms. Nonsmoker.    Past Medical History  Allergies as of 03/30/2025 - Reviewed 03/30/2025   Allergen Reaction Noted    Penicillins Anaphylaxis, Rash, and Swelling 11/23/2018    Adhesive Rash 08/10/2022    Adhesive tape-silicones Rash 06/27/2019       (Not in a hospital admission)       Past Medical History:   Diagnosis Date    Arthritis     back and neck    Asthma     GERD (gastroesophageal reflux disease)     Hyperlipidemia     Hypertension     Other abnormal and inconclusive findings on diagnostic imaging of breast 07/02/2014    Mammogram abnormal    Parkinson disease (Multi)     Personal history of other diseases of the circulatory system 02/25/2021    History of hypertension    Sleep apnea     uses CPAP    Type 2 diabetes mellitus        Past Surgical History:   Procedure Laterality Date    ANKLE FRACTURE SURGERY Left     ANKLE HARDWARE REMOVAL Left     CARDIAC CATHETERIZATION      COLONOSCOPY      DILATION AND CURETTAGE OF UTERUS  07/02/2014    Dilation And Curettage    TONSILLECTOMY  07/02/2014    Tonsillectomy    UPPER GASTROINTESTINAL ENDOSCOPY          reports that she has never smoked. She has never used smokeless tobacco. She reports that she does not drink alcohol and does not use drugs.    Review of Systems  Review of Systems       As in history of present illness                        Objective    Vitals:     "03/30/25 1141   BP: 118/81   Pulse: 72   Resp: 20   Temp: 36.2 °C (97.2 °F)   SpO2: 95%   Weight: 107 kg (236 lb)   Height: 1.702 m (5' 7\")     No LMP recorded (lmp unknown). Patient is postmenopausal.    Physical Exam  Gen.-alert cooperative and in no apparent distress  Head and face- no swelling redness, tenderness or rash  Eyes-EOMI, no redness or discharge is noted  ENT- bilateral nasal congestion with clear rhinorrhea and postnasal drip in oral pharynx  Neck- normal range of motion with no lymphadenopathy or mass.   Pulmonary- no respiratory distress noted. Lungs clear to auscultation without wheezes rhonchi or rales  Skin- no rashes discoloration or skin lesions noted  Lymphatic-- no lymph node swelling or tenderness appreciated  Procedures    Point of Care Test & Imaging Results from this visit  Results for orders placed or performed in visit on 03/30/25   POCT SPOTFIRE R/ST Panel Mini w/Strep A ([facility]) manually resulted   Result Value Ref Range    POC Group A Strep, PCR Negative Negative    POC Respiratory Syncytial Virus PCR Negative Negative    POC Influenza A Virus PCR Negative Negative    POC Influenza B Virus PCR Negative Negative    POC Human Rhinovirus PCR Positive (A) Negative      Imaging  No results found.    Cardiology, Vascular, and Other Imaging  No other imaging results found for the past 2 days      Diagnostic study results (if any) were reviewed by Barry Rand MD.    Assessment/Plan   Allergies, medications, history, and pertinent labs/EKGs/Imaging reviewed by Barry Rand MD.     Medical Decision Making  At time of discharge patient was clinically well-appearing and HDS for outpatient management. The patient and/or family was educated regarding diagnosis, supportive care, OTC and Rx medications. The patient and/or family was given the opportunity to ask questions prior to discharge.  They verbalized understanding of my discussion of the plans for treatment, expected course, " indications to return to  or seek further evaluation in ED, and the need for timely follow up as directed.   They were provided with a work/school excuse if requested.    Orders and Diagnoses  Diagnoses and all orders for this visit:  Cough, unspecified type  -     POCT SPOTFIRE R/ST Panel Mini w/Strep A ([facility]) manually resulted      Medical Admin Record      Patient disposition: Home    Electronically signed by Barry Rand MD  12:07 PM

## 2025-04-30 ENCOUNTER — HOSPITAL ENCOUNTER (OUTPATIENT)
Dept: GENERAL RADIOLOGY | Age: 65
Discharge: HOME | End: 2025-05-02
Attending: NEUROLOGICAL SURGERY
Payer: COMMERCIAL

## 2025-04-30 ENCOUNTER — INITIAL CONSULT (OUTPATIENT)
Age: 65
End: 2025-04-30
Attending: NEUROLOGICAL SURGERY
Payer: COMMERCIAL

## 2025-04-30 VITALS
HEIGHT: 67 IN | DIASTOLIC BLOOD PRESSURE: 82 MMHG | BODY MASS INDEX: 37.67 KG/M2 | SYSTOLIC BLOOD PRESSURE: 130 MMHG | WEIGHT: 240 LBS

## 2025-04-30 DIAGNOSIS — M48.02 CERVICAL STENOSIS OF SPINE: Primary | ICD-10-CM

## 2025-04-30 DIAGNOSIS — M48.062 SPINAL STENOSIS OF LUMBAR REGION WITH NEUROGENIC CLAUDICATION: ICD-10-CM

## 2025-04-30 DIAGNOSIS — M48.02 CERVICAL STENOSIS OF SPINE: ICD-10-CM

## 2025-04-30 PROCEDURE — 72110 X-RAY EXAM L-2 SPINE 4/>VWS: CPT

## 2025-04-30 RX ORDER — CARBIDOPA AND LEVODOPA 25; 100 MG/1; MG/1
1 TABLET ORAL 3 TIMES DAILY
COMMUNITY
Start: 2024-09-09

## 2025-04-30 ASSESSMENT — ENCOUNTER SYMPTOMS
BACK PAIN: 1
EYE PAIN: 0
TROUBLE SWALLOWING: 0
COUGH: 0
ABDOMINAL PAIN: 0
ABDOMINAL DISTENTION: 0
SHORTNESS OF BREATH: 0

## 2025-04-30 NOTE — PROGRESS NOTES
Patient Name: Stephanie Hunter : 1960        Date: 2025      Type of Appt: Consult    Reason for appt: C: LUMBAR STENOSIS     Referred by: Rabia Rojo     Studies done: No New Studies     Conservative Treatments (Have you ever tried any)  Physical Therapy in the last 6 months to a year: Yes  [facility]   NSAID's: Yes  Narcotics: No  Muscle relaxants:   Epidural injections: Yes     Any Blood Thinners :  [] Yes  [x] No       [] Aspirin    [] Eliquis     [] Xarelto    [] Pletal   [] Plavix    [] Warfarin        Diabetic:  [x] Yes   [] No   If yes, prescribed insulin: [] Yes   [x]  No      Do you take any : [] Yes   [x]  No      [] Ozempic   [] Wegovy    [] Trulicity    [] Mounjaro     Smoking: [] Yes   [x] No      REVIEW OF SYSTEMS:    [] Rash     [] Difficulty Urinating   [] Nausea    [] Fever      [] Headaches    [] Bruising/Bleeding Easily    [] Hearing loss     [] Constipation     [] Sleep Disturbance   [] Shortness of breath    [] Diarrhea   [] Neck Pain    [x] Back Pain   
distention and abdominal pain.   Genitourinary:  Negative for difficulty urinating and urgency.   Musculoskeletal:  Positive for back pain and neck pain.   Neurological:  Positive for weakness and numbness.   Hematological:  Negative for adenopathy. Does not bruise/bleed easily.   Psychiatric/Behavioral:  Negative for behavioral problems and confusion.          Physical Examination:    Vitals:    04/30/25 1525   BP: 130/82       Physical Exam  Constitutional:       Appearance: Normal appearance. She is well-developed.   HENT:      Head: Normocephalic and atraumatic.   Eyes:      Conjunctiva/sclera: Conjunctivae normal.      Comments: Pupils equal     Cardiovascular:      Comments: No peripheral edema  Pulmonary:      Effort: Pulmonary effort is normal. No respiratory distress.   Abdominal:      Palpations: Abdomen is soft.      Tenderness: There is no abdominal tenderness.   Musculoskeletal:      Cervical back: Normal range of motion and neck supple.   Skin:     General: Skin is warm and dry.   Neurological:      Mental Status: She is alert.      Coordination: Coordination normal.      Deep Tendon Reflexes:      Reflex Scores:       Bicep reflexes are 2+ on the right side and 2+ on the left side.       Patellar reflexes are 2+ on the right side and 2+ on the left side.       Achilles reflexes are 2+ on the right side and 2+ on the left side.     Comments: Motor 5/5 UE and LE except 4+ right deltoids and biceps and 4 - right hand  Sensation decreased to light touch right thigh  DTR's 2+ biceps, achilles and patella   Psychiatric:         Mood and Affect: Mood normal.         Speech: Speech normal.         Thought Content: Thought content normal.              Gait  shuffling            Radiology Personal review:    MRI brain and cervical spine done in  system July 2024 describe scattered increase signal in subcortical regions, no significant cervical stenosis  Thoracic and cervical plain films from 2023 reveal

## 2025-05-21 ENCOUNTER — TELEPHONE (OUTPATIENT)
Age: 65
End: 2025-05-21

## 2025-05-21 NOTE — TELEPHONE ENCOUNTER
Left message for the patient to please contact the office, we need to reschedule the appointment until after the studies are completed.

## 2025-06-09 ENCOUNTER — HOSPITAL ENCOUNTER (OUTPATIENT)
Dept: MRI IMAGING | Age: 65
Discharge: HOME | End: 2025-06-11
Payer: COMMERCIAL

## 2025-06-09 DIAGNOSIS — M48.062 SPINAL STENOSIS OF LUMBAR REGION WITH NEUROGENIC CLAUDICATION: ICD-10-CM

## 2025-06-09 DIAGNOSIS — M48.02 CERVICAL STENOSIS OF SPINE: ICD-10-CM

## 2025-06-09 PROCEDURE — 72148 MRI LUMBAR SPINE W/O DYE: CPT

## 2025-06-09 PROCEDURE — 72141 MRI NECK SPINE W/O DYE: CPT

## 2025-06-18 NOTE — PROGRESS NOTES
Patient Name: Stephanie Hunter : 1960        Date: 2025      Type of Appt: Follow up    Reason for appt: 4 week follow up with imaging     Pt last seen by Dr Austin 2025    Studies done: 25  MRI of Cervical Spine at [facility]  25 MRI of Lumbar Spine at [facility]  25 Xray of Lumbar Spine at [facility]    Conservative Treatments (Have you ever tried any)  Physical Therapy in the last 6 months to a year:   NSAID's: Yes  Narcotics: Yes  Muscle relaxants: No  Epidural injections: Yes     Any Blood Thinners: [] Yes   [x]  No        [] Aspirin   [] Eliquis   [] Xarelto   [] Pletal   [] Plavix    [] Warfarin        Diabetic:  [x] Yes   [] No   If yes, prescribed insulin:  [] Yes   [x]  No     Weight loss medications:   [] Yes  [x] No     [] Ozempic   [] Wegovy    [] Trulicity    [] Mounjaro         Smoking : [] Yes   [x]  No

## 2025-06-25 ENCOUNTER — OFFICE VISIT (OUTPATIENT)
Age: 65
End: 2025-06-25
Payer: COMMERCIAL

## 2025-06-25 VITALS — RESPIRATION RATE: 16 BRPM | BODY MASS INDEX: 37.67 KG/M2 | TEMPERATURE: 97.6 F | WEIGHT: 240 LBS | HEIGHT: 67 IN

## 2025-06-25 DIAGNOSIS — M48.062 SPINAL STENOSIS OF LUMBAR REGION WITH NEUROGENIC CLAUDICATION: Primary | ICD-10-CM

## 2025-06-25 DIAGNOSIS — M48.02 CERVICAL STENOSIS OF SPINE: ICD-10-CM

## 2025-06-25 PROCEDURE — 99214 OFFICE O/P EST MOD 30 MIN: CPT | Performed by: NEUROLOGICAL SURGERY

## 2025-06-25 ASSESSMENT — ENCOUNTER SYMPTOMS
SHORTNESS OF BREATH: 0
EYE PAIN: 0
COUGH: 0
ABDOMINAL PAIN: 0
BACK PAIN: 1
ABDOMINAL DISTENTION: 0
TROUBLE SWALLOWING: 0

## 2025-06-25 NOTE — PROGRESS NOTES
NEUROSURGERY and SPINE FOLLOW-UP NOTE      Patient Name: Stephanie Hunter  Patient : 1960  MRN: [id number]       PCP: Ishaan Mckinnon DO      History of Present Ilness: 64 y.o. presents with f/u. She continues to have similar sx, maybe worse.  She has 40-year history of low back pain which has been worse over the last several years.  Continues to have pain, numbness and weakness R>L UE and LE's. LBP and neck pain, LBP is the worst.  Has Parkinson's and DM.    Chief Complaint   Patient presents with    Follow-up     Patient presents today for a Follow up to review Imaging, MRI of Cervical and Lumbar Spine and Xray's Lumbar Spine  Symptoms patient reports she is stiff and her back hurts and she can not stand to brush her teeth. Patient has a headache   Pain 8/10             Conservative Treatments:  Physical Therapy: Yes  NSAID's: Yes  Narcotics: No  Muscle relaxants: Yes  Epidural injections: Yes      Past Medical History:    No past medical history on file.    Past Surgical History:    No past surgical history on file.    Home Medications:   Prior to Admission medications    Medication Sig Start Date End Date Taking? Authorizing Provider   carbidopa-levodopa (SINEMET)  MG per tablet Take 1 tablet by mouth 3 times daily 24  Yes Evangelina Isidro MD   tiZANidine (ZANAFLEX) 4 MG tablet Take 1 tablet by mouth nightly as needed (patient takes at night due to hallucinations with the medications) 24  Yes Evangelina Isidro MD   Cyanocobalamin (B-12) 100 MCG TABS Take by mouth   Yes Evangelina Isidro MD   lisinopril (PRINIVIL;ZESTRIL) 20 MG tablet lisinopril   daily   Yes Evangelina Isidro MD   Magnesium 100 MG CAPS    Yes Evangelina Isidro MD   Potassium 75 MG TABS Take by mouth   Yes Evangelina Isidro MD   Multiple Vitamins-Minerals (THR[employer] WOMENS) TABS    Yes Evangelina Isidro MD   Ascorbic Acid (VITAMIN C) 100 MG tablet    Yes Evangelina Isidro MD   glipiZIDE

## 2025-06-25 NOTE — PATIENT INSTRUCTIONS
We're looking forward to seeing you at your upcoming appointment     Now you can save time and skip the clipboard! Pre-Registration lets you complete your appointment paperwork and pay your copay directly from your MyChart. Then, when you arrive for your appointment, simply stop at central check in, located on the first floor, and then proceed to the suite. We are located on the first floor in [address], right next to central check in.     We are committed to providing you with exceptional care and look forward to seeing you at your upcoming appointment. If you have any questions or concerns, please do not hesitate to reach out to us.

## 2025-07-01 ENCOUNTER — INITIAL CONSULT (OUTPATIENT)
Age: 65
End: 2025-07-01

## 2025-07-01 VITALS
HEART RATE: 70 BPM | DIASTOLIC BLOOD PRESSURE: 68 MMHG | HEIGHT: 67 IN | WEIGHT: 245 LBS | OXYGEN SATURATION: 100 % | BODY MASS INDEX: 38.45 KG/M2 | SYSTOLIC BLOOD PRESSURE: 126 MMHG | TEMPERATURE: 98.3 F

## 2025-07-01 DIAGNOSIS — M54.41 CHRONIC BILATERAL LOW BACK PAIN WITH SCIATICA, SCIATICA LATERALITY UNSPECIFIED: ICD-10-CM

## 2025-07-01 DIAGNOSIS — M54.42 CHRONIC BILATERAL LOW BACK PAIN WITH SCIATICA, SCIATICA LATERALITY UNSPECIFIED: ICD-10-CM

## 2025-07-01 DIAGNOSIS — M47.816 LUMBAR SPONDYLOSIS: ICD-10-CM

## 2025-07-01 DIAGNOSIS — M54.16 LUMBAR RADICULOPATHY: Primary | ICD-10-CM

## 2025-07-01 DIAGNOSIS — G89.29 CHRONIC BILATERAL LOW BACK PAIN WITH SCIATICA, SCIATICA LATERALITY UNSPECIFIED: ICD-10-CM

## 2025-07-01 PROCEDURE — 99204 OFFICE O/P NEW MOD 45 MIN: CPT | Performed by: PAIN MEDICINE

## 2025-07-01 PROCEDURE — 99203 OFFICE O/P NEW LOW 30 MIN: CPT | Performed by: PAIN MEDICINE

## 2025-07-01 ASSESSMENT — ENCOUNTER SYMPTOMS
CONSTIPATION: 0
NAUSEA: 0
SHORTNESS OF BREATH: 0
DIARRHEA: 0
BACK PAIN: 1

## 2025-07-01 NOTE — PROGRESS NOTES
[facility]  [facility]  [address], [address]  [place], [place]  P: [phone]  F: [phone]        Stephanie Hunter  (1960)    7/1/2025    Subjective:     Stephanie Hunter is 64 y.o. female who complains today of:    Chief Complaint   Patient presents with    Consultation    Back Pain       HPI    Patient with PMH of Parkinson's DIsease, DM, right Carpal Tunnel Release.     Patient presents with lower back pain that radiates into the right leg/anterior thigh. Pain is chronic and had bee present for years.     Pain is chronic  Pain is described as throbbing, aching, stabbing, sharp, shooting, and tingling.  Pain level today is rated 6 on a 10 on a NRS scale.  It is severe in nature.  Pain is affecting the patients ability to perform activities of daily living especially with regards to personal hygiene, household chores and self care.  With pain medication, the patient is better able to perform ADLs.  Pain in part is secondary to osteoarthritis of the spine.  The patient is tolerating her pain medication (Motrin, Tizanidine) regimen without any adverse effects.  Pain is aggravated by bending, lifting, twisting, walking, and standing  Pain is not associated with fevers/chills/night sweats.  Bowel and bladder are working appropriately.  Feels that her balance is off lately as well, but attributes mainly to Parkinson;d Disease.   She has had an injection in the past at [facility].   After Physical Therapy, she did not get much relief. She did PT for almost a year up until August 20205 for the shoulder, hand and back. No help from any of the sessions for the spine.       Allergies:  Penicillins and Adhesive tape    No past medical history on file.  No past surgical history on file.  No family history on file.  Social History     Socioeconomic History    Marital status:      Spouse name: Not on file    Number of children:

## 2025-07-02 ENCOUNTER — TELEPHONE (OUTPATIENT)
Age: 65
End: 2025-07-02

## 2025-07-02 NOTE — TELEPHONE ENCOUNTER
RIGHT L3-4 TFESI.  [employer] IS SHOWING INACTIVE TERM DATE 6/21/2025.  CAN SOMEONE CALL THE PT TO CONFIRM IF SHE HAS OTHER INSURANCE OR IF SHE WILL BE SELF PAY?

## 2025-07-30 DIAGNOSIS — I10 HYPERTENSION, UNSPECIFIED TYPE: ICD-10-CM

## 2025-07-30 RX ORDER — LISINOPRIL 20 MG/1
20 TABLET ORAL DAILY
Qty: 90 TABLET | Refills: 1 | Status: SHIPPED | OUTPATIENT
Start: 2025-07-30 | End: 2025-07-31 | Stop reason: SDUPTHER

## 2025-07-31 DIAGNOSIS — I10 HYPERTENSION, UNSPECIFIED TYPE: ICD-10-CM

## 2025-07-31 RX ORDER — LISINOPRIL 20 MG/1
20 TABLET ORAL DAILY
Qty: 90 TABLET | Refills: 1 | Status: SHIPPED | OUTPATIENT
Start: 2025-07-31

## 2025-08-01 ENCOUNTER — TELEPHONE (OUTPATIENT)
Age: 65
End: 2025-08-01

## 2025-08-13 ENCOUNTER — HOSPITAL ENCOUNTER (OUTPATIENT)
Age: 65
Discharge: HOME | End: 2025-08-13
Attending: PAIN MEDICINE
Payer: COMMERCIAL

## 2025-08-13 VITALS
SYSTOLIC BLOOD PRESSURE: 104 MMHG | DIASTOLIC BLOOD PRESSURE: 68 MMHG | OXYGEN SATURATION: 97 % | TEMPERATURE: 97.7 F | HEIGHT: 67 IN | WEIGHT: 247 LBS | BODY MASS INDEX: 38.77 KG/M2 | HEART RATE: 79 BPM

## 2025-08-13 DIAGNOSIS — R52 PAIN: ICD-10-CM

## 2025-08-13 PROBLEM — M54.16 LUMBAR RADICULOPATHY: Status: ACTIVE | Noted: 2025-08-13

## 2025-08-13 PROCEDURE — 6360000002 HC RX W HCPCS: Performed by: PAIN MEDICINE

## 2025-08-13 PROCEDURE — 64483 NJX AA&/STRD TFRM EPI L/S 1: CPT

## 2025-08-13 PROCEDURE — 6360000004 HC RX CONTRAST MEDICATION: Performed by: PAIN MEDICINE

## 2025-08-13 PROCEDURE — 2500000003 HC RX 250 WO HCPCS: Performed by: PAIN MEDICINE

## 2025-08-13 PROCEDURE — 64483 NJX AA&/STRD TFRM EPI L/S 1: CPT | Performed by: PAIN MEDICINE

## 2025-08-13 RX ORDER — DEXAMETHASONE SODIUM PHOSPHATE 10 MG/ML
5 INJECTION, SOLUTION INTRAMUSCULAR; INTRAVENOUS ONCE
Status: COMPLETED | OUTPATIENT
Start: 2025-08-13 | End: 2025-08-13

## 2025-08-13 RX ORDER — LIDOCAINE HYDROCHLORIDE 10 MG/ML
2 INJECTION, SOLUTION EPIDURAL; INFILTRATION; INTRACAUDAL; PERINEURAL ONCE
Status: COMPLETED | OUTPATIENT
Start: 2025-08-13 | End: 2025-08-13

## 2025-08-13 RX ORDER — SODIUM CHLORIDE 9 MG/ML
1 INJECTION, SOLUTION INTRAMUSCULAR; INTRAVENOUS; SUBCUTANEOUS ONCE
Status: COMPLETED | OUTPATIENT
Start: 2025-08-13 | End: 2025-08-13

## 2025-08-13 RX ADMIN — SODIUM CHLORIDE 1 ML: 9 INJECTION INTRAMUSCULAR; INTRAVENOUS; SUBCUTANEOUS at 15:13

## 2025-08-13 RX ADMIN — LIDOCAINE HYDROCHLORIDE 2 ML: 10 INJECTION, SOLUTION EPIDURAL; INFILTRATION; INTRACAUDAL; PERINEURAL at 15:13

## 2025-08-13 RX ADMIN — IOHEXOL 1 ML: 300 INJECTION, SOLUTION INTRAVENOUS at 15:12

## 2025-08-13 RX ADMIN — DEXAMETHASONE SODIUM PHOSPHATE 5 MG: 10 INJECTION, SOLUTION INTRAMUSCULAR; INTRAVENOUS at 15:11

## 2025-08-13 ASSESSMENT — PAIN DESCRIPTION - LOCATION
LOCATION: BACK
LOCATION: BACK

## 2025-08-13 ASSESSMENT — PAIN SCALES - GENERAL
PAINLEVEL_OUTOF10: 6
PAINLEVEL_OUTOF10: 6

## (undated) DEVICE — SYRINGE, 60 CC, IRRIGATION, BULB, CONTRO-BULB, PAPER POUCH

## (undated) DEVICE — STRAP, ARM BOARD, 32 X 1.5

## (undated) DEVICE — PREP, SCRUB, SKIN, FOAM, HIBICLENS, 4 OZ

## (undated) DEVICE — TRAY, DRY PREP, PREMIUM

## (undated) DEVICE — ADHESIVE, SKIN, MASTISOL, 2/3 CC VIAL

## (undated) DEVICE — GLOVE, SURGICAL, PROTEXIS PI BLUE W/NEUTHERA, 6.5, PF, LF

## (undated) DEVICE — TOWEL PACK, STERILE, 16X24, XRAY DETECTABLE, BLUE, 4/PK

## (undated) DEVICE — GLOVE, SURGICAL, PROTEXIS PI , 6.5, PF, LF

## (undated) DEVICE — Device

## (undated) DEVICE — MANIFOLD, 4 PORT NEPTUNE STANDARD

## (undated) DEVICE — STRAP, VELCRO, BODY, 4 X 60IN, NS

## (undated) DEVICE — STRIP, SKIN CLOSURE, STERI STRIP, REINFORCED, 0.5 X 4 IN

## (undated) DEVICE — GOWN, SURGICAL, ROYAL SILK, LG, STERILE

## (undated) DEVICE — SUTURE, ETHILON, 4-0, BLK, MONO, PS-2 18